# Patient Record
Sex: MALE | Race: WHITE | NOT HISPANIC OR LATINO | Employment: OTHER | URBAN - METROPOLITAN AREA
[De-identification: names, ages, dates, MRNs, and addresses within clinical notes are randomized per-mention and may not be internally consistent; named-entity substitution may affect disease eponyms.]

---

## 2023-01-30 PROBLEM — I11.9 BENIGN HYPERTENSIVE HEART DISEASE: Status: ACTIVE | Noted: 2023-01-30

## 2023-01-30 PROBLEM — D31.31 CHOROIDAL NEVUS OF RIGHT EYE: Status: ACTIVE | Noted: 2023-01-30

## 2023-01-30 PROBLEM — M25.562 LEFT KNEE PAIN: Status: ACTIVE | Noted: 2023-01-30

## 2023-01-30 PROBLEM — C67.9 BLADDER CANCER (MULTI): Status: ACTIVE | Noted: 2023-01-30

## 2023-01-30 PROBLEM — R31.0 GROSS HEMATURIA: Status: ACTIVE | Noted: 2023-01-30

## 2023-01-30 PROBLEM — M25.552 LEFT HIP PAIN: Status: ACTIVE | Noted: 2023-01-30

## 2023-01-30 PROBLEM — R82.90 ABNORMAL URINALYSIS: Status: ACTIVE | Noted: 2023-01-30

## 2023-01-30 PROBLEM — R97.20 BPH WITH ELEVATED PSA: Status: ACTIVE | Noted: 2023-01-30

## 2023-01-30 PROBLEM — N40.0 BPH WITH ELEVATED PSA: Status: ACTIVE | Noted: 2023-01-30

## 2023-01-30 PROBLEM — R03.0 BLOOD PRESSURE ELEVATED WITHOUT HISTORY OF HTN: Status: ACTIVE | Noted: 2023-01-30

## 2023-01-30 PROBLEM — M25.511 RIGHT SHOULDER PAIN: Status: ACTIVE | Noted: 2023-01-30

## 2023-01-30 PROBLEM — R60.9 EDEMA: Status: ACTIVE | Noted: 2023-01-30

## 2023-01-30 PROBLEM — R00.1 SINUS BRADYCARDIA: Status: ACTIVE | Noted: 2023-01-30

## 2023-01-30 PROBLEM — E78.2 MIXED HYPERLIPIDEMIA: Status: ACTIVE | Noted: 2023-01-30

## 2023-01-30 PROBLEM — R35.0 URINE FREQUENCY: Status: ACTIVE | Noted: 2023-01-30

## 2023-01-30 PROBLEM — I10 BENIGN ESSENTIAL HYPERTENSION: Status: ACTIVE | Noted: 2023-01-30

## 2023-01-30 PROBLEM — H91.90 HEARING LOSS: Status: ACTIVE | Noted: 2023-01-30

## 2023-01-30 PROBLEM — M17.9 KNEE OSTEOARTHRITIS: Status: ACTIVE | Noted: 2023-01-30

## 2023-01-30 PROBLEM — G47.30 SLEEP APNEA: Status: ACTIVE | Noted: 2023-01-30

## 2023-01-30 PROBLEM — M16.9 HIP OSTEOARTHRITIS: Status: ACTIVE | Noted: 2023-01-30

## 2023-01-30 PROBLEM — H25.813 COMBINED FORM OF AGE-RELATED CATARACT, BOTH EYES: Status: ACTIVE | Noted: 2023-01-30

## 2023-01-30 PROBLEM — M24.549: Status: ACTIVE | Noted: 2023-01-30

## 2023-01-30 PROBLEM — R73.02 GLUCOSE INTOLERANCE (IMPAIRED GLUCOSE TOLERANCE): Status: ACTIVE | Noted: 2023-01-30

## 2023-01-30 RX ORDER — BACILLUS CALMETTE-GUERIN 50 MG/50ML
POWDER, FOR SUSPENSION INTRAVESICAL
COMMUNITY
Start: 2020-06-02 | End: 2024-01-31 | Stop reason: HOSPADM

## 2023-01-30 RX ORDER — TAMSULOSIN HYDROCHLORIDE 0.4 MG/1
1 CAPSULE ORAL DAILY
COMMUNITY
Start: 2018-03-26 | End: 2023-11-01

## 2023-01-30 RX ORDER — MOXIFLOXACIN 5 MG/ML
1 SOLUTION/ DROPS OPHTHALMIC 3 TIMES DAILY
COMMUNITY
End: 2024-06-03 | Stop reason: ALTCHOICE

## 2023-01-30 RX ORDER — DICLOFENAC SODIUM 1 MG/ML
1 SOLUTION/ DROPS OPHTHALMIC 3 TIMES DAILY
COMMUNITY
End: 2024-06-03 | Stop reason: ALTCHOICE

## 2023-01-30 RX ORDER — PREDNISOLONE ACETATE 10 MG/ML
1 SUSPENSION/ DROPS OPHTHALMIC 3 TIMES DAILY
COMMUNITY
End: 2024-06-03 | Stop reason: ALTCHOICE

## 2023-01-30 RX ORDER — LIFITEGRAST 50 MG/ML
SOLUTION/ DROPS OPHTHALMIC
COMMUNITY
End: 2024-06-03 | Stop reason: ALTCHOICE

## 2023-01-30 RX ORDER — ACETAZOLAMIDE 250 MG/1
1 TABLET ORAL 2 TIMES DAILY
COMMUNITY
End: 2024-06-03 | Stop reason: ALTCHOICE

## 2023-02-23 LAB
PROSTATE SPECIFIC AG (NG/ML) IN SER/PLAS: 5.1 NG/ML (ref 0–4)
PROSTATE SPECIFIC AG FREE (NG/ML) IN SER/PLAS: 1.4 NG/ML
PROSTATE SPECIFIC AG FREE/PROSTATE SPECIFIC AG TOTAL IN SER/PLAS: 27 %

## 2023-03-14 ENCOUNTER — OFFICE VISIT (OUTPATIENT)
Dept: PRIMARY CARE | Facility: CLINIC | Age: 73
End: 2023-03-14
Payer: MEDICARE

## 2023-03-14 VITALS — DIASTOLIC BLOOD PRESSURE: 73 MMHG | BODY MASS INDEX: 33.9 KG/M2 | SYSTOLIC BLOOD PRESSURE: 141 MMHG | WEIGHT: 264 LBS

## 2023-03-14 DIAGNOSIS — E78.2 MIXED HYPERLIPIDEMIA: ICD-10-CM

## 2023-03-14 DIAGNOSIS — G47.30 SLEEP APNEA, UNSPECIFIED TYPE: ICD-10-CM

## 2023-03-14 DIAGNOSIS — I10 BENIGN ESSENTIAL HYPERTENSION: Primary | ICD-10-CM

## 2023-03-14 PROCEDURE — 1157F ADVNC CARE PLAN IN RCRD: CPT | Performed by: INTERNAL MEDICINE

## 2023-03-14 PROCEDURE — 99213 OFFICE O/P EST LOW 20 MIN: CPT | Performed by: INTERNAL MEDICINE

## 2023-03-14 PROCEDURE — 3078F DIAST BP <80 MM HG: CPT | Performed by: INTERNAL MEDICINE

## 2023-03-14 PROCEDURE — 3077F SYST BP >= 140 MM HG: CPT | Performed by: INTERNAL MEDICINE

## 2023-03-14 NOTE — PROGRESS NOTES
Subjective   Patient ID: Robson Fletcher is a 72 y.o. male who presents for No chief complaint on file..    HPI follow-up visit he had been on the losartan medication no side effects even brought a home blood pressure monitoring machine had been averaging in the 130s over 70s with normal pulse rate weight has not changed much as not exercising as much as he had hoped to had follow-up for his prostate with a renal ultrasound there were some new cysts that were found he has follow-up with a urologist upcoming    Review of Systems    Objective   There were no vitals taken for this visit.    Physical Exam vital signs noted alert and oriented x3 NCAT no JVD chest clear to auscultation and percussion CV regular rate and rhythm S1-S2 without murmur gallop or rub extremities no clubbing cyanosis or edema normal distal pulses    Assessment/Plan impression hypertension hyperlipidemia LINDA  Plan he has been wearing the CPAP with his new mask 6 to 7 hours per night he has not yet been able to lose weight advised on decreasing carbohydrates in salt and increasing exercise and water consumption then will recheck weight and blood pressure along with his blood work his LDL cholesterols were reviewed and will recheck at his physical examination and may consider statin medication changes at that time follow-up sooner as needed continue with the losartan

## 2023-06-15 LAB — PROSTATE SPECIFIC AG (NG/ML) IN SER/PLAS: 4.33 NG/ML (ref 0–4)

## 2023-06-20 ENCOUNTER — OFFICE VISIT (OUTPATIENT)
Dept: PRIMARY CARE | Facility: CLINIC | Age: 73
End: 2023-06-20
Payer: MEDICARE

## 2023-06-20 VITALS
SYSTOLIC BLOOD PRESSURE: 122 MMHG | WEIGHT: 249.75 LBS | DIASTOLIC BLOOD PRESSURE: 72 MMHG | BODY MASS INDEX: 32.07 KG/M2

## 2023-06-20 DIAGNOSIS — G47.30 SLEEP APNEA, UNSPECIFIED TYPE: ICD-10-CM

## 2023-06-20 DIAGNOSIS — I10 PRIMARY HYPERTENSION: ICD-10-CM

## 2023-06-20 DIAGNOSIS — Z00.00 HEALTH CARE MAINTENANCE: Primary | ICD-10-CM

## 2023-06-20 DIAGNOSIS — E78.2 MIXED HYPERLIPIDEMIA: ICD-10-CM

## 2023-06-20 DIAGNOSIS — N40.0 BENIGN PROSTATIC HYPERPLASIA, UNSPECIFIED WHETHER LOWER URINARY TRACT SYMPTOMS PRESENT: ICD-10-CM

## 2023-06-20 LAB
ANION GAP IN SER/PLAS: 14 MMOL/L (ref 10–20)
CALCIUM (MG/DL) IN SER/PLAS: 9.3 MG/DL (ref 8.6–10.6)
CARBON DIOXIDE, TOTAL (MMOL/L) IN SER/PLAS: 24 MMOL/L (ref 21–32)
CHLORIDE (MMOL/L) IN SER/PLAS: 107 MMOL/L (ref 98–107)
CHOLESTEROL (MG/DL) IN SER/PLAS: 214 MG/DL (ref 0–199)
CHOLESTEROL IN HDL (MG/DL) IN SER/PLAS: 56.8 MG/DL
CHOLESTEROL/HDL RATIO: 3.8
CREATININE (MG/DL) IN SER/PLAS: 0.81 MG/DL (ref 0.5–1.3)
ERYTHROCYTE DISTRIBUTION WIDTH (RATIO) BY AUTOMATED COUNT: 13.6 % (ref 11.5–14.5)
ERYTHROCYTE MEAN CORPUSCULAR HEMOGLOBIN CONCENTRATION (G/DL) BY AUTOMATED: 31.8 G/DL (ref 32–36)
ERYTHROCYTE MEAN CORPUSCULAR VOLUME (FL) BY AUTOMATED COUNT: 92 FL (ref 80–100)
ERYTHROCYTES (10*6/UL) IN BLOOD BY AUTOMATED COUNT: 4.88 X10E12/L (ref 4.5–5.9)
GFR MALE: >90 ML/MIN/1.73M2
GLUCOSE (MG/DL) IN SER/PLAS: 82 MG/DL (ref 74–99)
HEMATOCRIT (%) IN BLOOD BY AUTOMATED COUNT: 44.9 % (ref 41–52)
HEMOGLOBIN (G/DL) IN BLOOD: 14.3 G/DL (ref 13.5–17.5)
LDL: 144 MG/DL (ref 0–99)
LEUKOCYTES (10*3/UL) IN BLOOD BY AUTOMATED COUNT: 4.5 X10E9/L (ref 4.4–11.3)
NRBC (PER 100 WBCS) BY AUTOMATED COUNT: 0 /100 WBC (ref 0–0)
PLATELETS (10*3/UL) IN BLOOD AUTOMATED COUNT: 206 X10E9/L (ref 150–450)
POTASSIUM (MMOL/L) IN SER/PLAS: 4.3 MMOL/L (ref 3.5–5.3)
SODIUM (MMOL/L) IN SER/PLAS: 141 MMOL/L (ref 136–145)
TRIGLYCERIDE (MG/DL) IN SER/PLAS: 65 MG/DL (ref 0–149)
UREA NITROGEN (MG/DL) IN SER/PLAS: 17 MG/DL (ref 6–23)
VLDL: 13 MG/DL (ref 0–40)

## 2023-06-20 PROCEDURE — 93000 ELECTROCARDIOGRAM COMPLETE: CPT | Performed by: INTERNAL MEDICINE

## 2023-06-20 PROCEDURE — 80061 LIPID PANEL: CPT

## 2023-06-20 PROCEDURE — 1157F ADVNC CARE PLAN IN RCRD: CPT | Performed by: INTERNAL MEDICINE

## 2023-06-20 PROCEDURE — 1160F RVW MEDS BY RX/DR IN RCRD: CPT | Performed by: INTERNAL MEDICINE

## 2023-06-20 PROCEDURE — G0439 PPPS, SUBSEQ VISIT: HCPCS | Performed by: INTERNAL MEDICINE

## 2023-06-20 PROCEDURE — 80048 BASIC METABOLIC PNL TOTAL CA: CPT

## 2023-06-20 PROCEDURE — 1036F TOBACCO NON-USER: CPT | Performed by: INTERNAL MEDICINE

## 2023-06-20 PROCEDURE — 3078F DIAST BP <80 MM HG: CPT | Performed by: INTERNAL MEDICINE

## 2023-06-20 PROCEDURE — 85027 COMPLETE CBC AUTOMATED: CPT

## 2023-06-20 PROCEDURE — 1170F FXNL STATUS ASSESSED: CPT | Performed by: INTERNAL MEDICINE

## 2023-06-20 PROCEDURE — 3074F SYST BP LT 130 MM HG: CPT | Performed by: INTERNAL MEDICINE

## 2023-06-20 PROCEDURE — 1159F MED LIST DOCD IN RCRD: CPT | Performed by: INTERNAL MEDICINE

## 2023-06-20 PROCEDURE — 99214 OFFICE O/P EST MOD 30 MIN: CPT | Performed by: INTERNAL MEDICINE

## 2023-06-20 NOTE — PROGRESS NOTES
Subjective   Patient ID: Robson Fletcher is a 73 y.o. male who presents for No chief complaint on file..    HPI CPE see updated front sheet no chest pain no shortness of breath no side effect with medication trying to sleep between 6 and 7 hours each night with his CPAP has been to the urologist PSA is currently on the lower side for him has upcoming scans in the fall and follow-up with their bowels normal bones muscles joints okay    Past medical history hypertension hyperlipidemia LINDA prostate    Medication losartan    Allergies noted and unchanged    Social history no tobacco    Family history noted and unchanged    Prevention some exercise colonoscopy approximately 5 years ago has had follow-up with urology prior laboratory results reviewed    Depression screen not depressed    Review of Systems    Objective   There were no vitals taken for this visit.    Physical Exam  Vital signs noted alert and oriented x3 NCAT PERRLA EOMI nares without discharge OP benign TM normal bilateral EAC clear bilateral no AC nodes no JVD or bruit no thyromegaly chest clear to auscultation and percussion CV regular rate and rhythm S1-S2 without murmur gallop or rub abdomen soft nontender normal active bowel sounds no rebound or guarding no HSM LS spine normal curvature negative straight leg raise negative logroll negative SI joint tenderness extremities no clubbing cyanosis or edema normal distal pulses DTR 2+  Assessment/Plan    impression General medical examination prostate diagnosis hypertension hyperlipidemia LINDA  Plan check EKG rule out LVH advised on blood pressure blood pressure medicine continue with losartan at the current time check Chem-7 advised on glucose potassium and kidney function check CBC advised on blood count follow-up on next colonoscopy continue with CPAP sleep hygiene check lipid panel advised on cholesterol profile previous PSA noted previous lipids discussed with him good diet regular exercise increase water  consumption weight loss recheck 3 months based on above TT 60 cc 31    follow-up by neurology (d/w) check and previous visits (cardiac and cpap) check

## 2023-07-02 ASSESSMENT — ENCOUNTER SYMPTOMS
OCCASIONAL FEELINGS OF UNSTEADINESS: 0
LOSS OF SENSATION IN FEET: 0
DEPRESSION: 0

## 2023-07-02 ASSESSMENT — ACTIVITIES OF DAILY LIVING (ADL)
GROCERY_SHOPPING: INDEPENDENT
BATHING: INDEPENDENT
DOING_HOUSEWORK: INDEPENDENT
DRESSING: INDEPENDENT
TAKING_MEDICATION: INDEPENDENT
MANAGING_FINANCES: INDEPENDENT

## 2023-07-02 ASSESSMENT — PATIENT HEALTH QUESTIONNAIRE - PHQ9
2. FEELING DOWN, DEPRESSED OR HOPELESS: NOT AT ALL
1. LITTLE INTEREST OR PLEASURE IN DOING THINGS: NOT AT ALL
SUM OF ALL RESPONSES TO PHQ9 QUESTIONS 1 AND 2: 0

## 2023-08-30 PROBLEM — N28.1 BILATERAL RENAL CYSTS: Status: ACTIVE | Noted: 2023-08-30

## 2023-08-30 PROBLEM — Z96.1 PSEUDOPHAKIA OF BOTH EYES: Status: ACTIVE | Noted: 2023-08-30

## 2023-08-30 RX ORDER — LOSARTAN POTASSIUM 50 MG/1
50 TABLET ORAL DAILY
COMMUNITY
Start: 2023-05-24 | End: 2023-11-02

## 2023-09-22 LAB
ANION GAP IN SER/PLAS: 11 MMOL/L (ref 10–20)
CALCIUM (MG/DL) IN SER/PLAS: 8.8 MG/DL (ref 8.6–10.3)
CARBON DIOXIDE, TOTAL (MMOL/L) IN SER/PLAS: 26 MMOL/L (ref 21–32)
CHLORIDE (MMOL/L) IN SER/PLAS: 109 MMOL/L (ref 98–107)
CREATININE (MG/DL) IN SER/PLAS: 0.79 MG/DL (ref 0.5–1.3)
GFR MALE: >90 ML/MIN/1.73M2
GLUCOSE (MG/DL) IN SER/PLAS: 96 MG/DL (ref 74–99)
POTASSIUM (MMOL/L) IN SER/PLAS: 4.1 MMOL/L (ref 3.5–5.3)
PROSTATE SPECIFIC AG (NG/ML) IN SER/PLAS: 4.92 NG/ML (ref 0–4)
SODIUM (MMOL/L) IN SER/PLAS: 142 MMOL/L (ref 136–145)
UREA NITROGEN (MG/DL) IN SER/PLAS: 16 MG/DL (ref 6–23)

## 2023-10-13 ENCOUNTER — OFFICE VISIT (OUTPATIENT)
Dept: PRIMARY CARE | Facility: CLINIC | Age: 73
End: 2023-10-13
Payer: MEDICARE

## 2023-10-13 VITALS — DIASTOLIC BLOOD PRESSURE: 76 MMHG | SYSTOLIC BLOOD PRESSURE: 128 MMHG

## 2023-10-13 DIAGNOSIS — Z00.00 HEALTH CARE MAINTENANCE: Primary | ICD-10-CM

## 2023-10-13 DIAGNOSIS — I10 PRIMARY HYPERTENSION: ICD-10-CM

## 2023-10-13 DIAGNOSIS — E78.2 MIXED HYPERLIPIDEMIA: ICD-10-CM

## 2023-10-13 DIAGNOSIS — K44.9 HIATAL HERNIA: ICD-10-CM

## 2023-10-13 DIAGNOSIS — I70.90 ATHEROSCLEROSIS: ICD-10-CM

## 2023-10-13 PROCEDURE — 99213 OFFICE O/P EST LOW 20 MIN: CPT | Performed by: INTERNAL MEDICINE

## 2023-10-13 PROCEDURE — 1159F MED LIST DOCD IN RCRD: CPT | Performed by: INTERNAL MEDICINE

## 2023-10-13 PROCEDURE — 3074F SYST BP LT 130 MM HG: CPT | Performed by: INTERNAL MEDICINE

## 2023-10-13 PROCEDURE — 1126F AMNT PAIN NOTED NONE PRSNT: CPT | Performed by: INTERNAL MEDICINE

## 2023-10-13 PROCEDURE — 1160F RVW MEDS BY RX/DR IN RCRD: CPT | Performed by: INTERNAL MEDICINE

## 2023-10-13 PROCEDURE — 3078F DIAST BP <80 MM HG: CPT | Performed by: INTERNAL MEDICINE

## 2023-10-13 PROCEDURE — 1036F TOBACCO NON-USER: CPT | Performed by: INTERNAL MEDICINE

## 2023-10-13 RX ORDER — ATORVASTATIN CALCIUM 20 MG/1
20 TABLET, FILM COATED ORAL DAILY
Qty: 30 TABLET | Refills: 5 | Status: SHIPPED | OUTPATIENT
Start: 2023-10-13 | End: 2023-11-07

## 2023-10-13 NOTE — PROGRESS NOTES
Subjective   Patient ID: Robson Fletcher is a 73 y.o. male who presents for No chief complaint on file..    HPI follow-up visit had CT scan with the urologist but is unable to have an appointment till December and he was concerned about some of the findings had been followed for cysts in the left kidney apparently there were some in the right kidney but not available on this current CT scan also some SMA atherosclerosis and a hiatal hernia no chest pain no shortness of breath previous results reviewed    Review of Systems    Objective   There were no vitals taken for this visit.    Physical Exam vital signs noted alert and oriented x3 NCAT no JVD or bruit chest clear to auscultation CV regular rate and rhythm S1-S2 abdomen soft nontender normal active bowel sounds extremities no clubbing cyanosis or edema normal distal pulses    Assessment/Plan impression SMA atherosclerosis hypertension hyperlipidemia renal cyst BPH with increased PSA hiatal hernia  Plan follow-up with urology regarding BPH and PSA and cysts likely do not need much in terms of treatment for these cysts continue with good blood pressure measuring and monitoring avoidance of NSAIDs PPI or H2 blocker if symptomatic not current for hiatal hernia diet weight loss exercise continue with losartan okay to add atorvastatin 20 mg 1 p.o. daily and recheck blood pressure and lipids in February 2024 recheck anytime sooner

## 2023-10-26 NOTE — PROGRESS NOTES
FUV    Last seen - 3/17/23     HISTORY OF PRESENT ILLNESS:   Bernabe Fletcher is a 73 y.o. male who is being seen today for cystoscopic surveillance.     Hx: -BPH with 2 prior negative prostate biopsies (most recent 11/27/19)  -  non-invasive papillary urothelial carcinoma, high-grade, diagnosed 11/2018,   -s/p completion of BCG induction, and maintenance 10/2019, 06/2020, and 01/22/2021, and bladder biopsy on 06/22/2021.     Prior Labs: PSA panel on 2/20/23 was 5.1 with 27% free, PSA on 06/03/2022 was 4.89 and on 05/26/2020 was 4.3 and 5.49 on 08/08/19.     Prior Imaging: Renal US on 2/20/23 demonstrated round hypoechoic mass centered in the corticomedullary junction in the medial interpolar region measuring 1.6 cm. There is another such finding in the upper pole measuring 1.2 cm. No calculus or perinephric abnormality. Also possible round hypoechoic mass in the left lower pole measuring 2.8 cm.     PAST MEDICAL HISTORY:  Past Medical History:   Diagnosis Date    Essential (primary) hypertension 07/05/2021    Benign essential hypertension       PAST SURGICAL HISTORY:  Past Surgical History:   Procedure Laterality Date    APPENDECTOMY  07/06/2017    Appendectomy    APPENDECTOMY  10/13/2014    Appendectomy    COLONOSCOPY  10/13/2014    Complete Colonoscopy        ALLERGIES:   No Known Allergies     MEDICATIONS:   Current Outpatient Medications   Medication Instructions    acetaZOLAMIDE (Diamox) 250 mg tablet 1 tablet, oral, 2 times daily    atorvastatin (LIPITOR) 20 mg, oral, Daily    BCG, live, ([place] BCG) 50 mg injection Give 1 vial weekly x 3 weeks for maintenance dose    diclofenac (Voltaren) 0.1 % ophthalmic solution 1 drop, Right Eye, 3 times daily, For 3 days before surgery, and 1 week after surgery    lifitegrast (Xiidra) 5 % dropperette ophthalmic (eye)    losartan (COZAAR) 50 mg, oral, Daily    moxifloxacin (Vigamox) 0.5 % ophthalmic solution 1 drop, Right Eye, 3 times daily, Before surgery and 3 weeks after  "surgery    prednisoLONE acetate (Pred-Forte) 1 % ophthalmic suspension 1 drop, Left Eye, 3 times daily, 3 weeks after surgery    tamsulosin (Flomax) 0.4 mg 24 hr capsule 1 capsule, oral, Daily        PHYSICAL EXAM:  There were no vitals taken for this visit.  Constitutional: Patient appears well-developed and well-nourished. No distress.    Pulmonary/Chest: Effort normal. No respiratory distress.   Abdominal: Soft, ND NT  :   Musculoskeletal: Normal range of motion.    Neurological: Alert and oriented to person, place, and time.  Psychiatric: Normal mood and affect. Behavior is normal. Thought content normal.      Labs  No results found for: \"TESTOSTERONE\"  Lab Results   Component Value Date    PSA 4.92 (H) 09/22/2023     No components found for: \"CBC\"  Lab Results   Component Value Date    CREATININE 0.79 09/22/2023     No components found for: \"TESTOTMS\"  No results found for: \"TESTF\"    Imaging: Renal CT 9/26/23 demonstrating Lower pole cyst left kidney with additional probable parapelvic cyst. No solid enhancing renal mass identified. Atherosclerosis. Mild stenosis of the proximal SMA.    Discussion: Patient is doing well. No new urinary complaints. Denies hematuria. Cystoscopy was completed today due to high grade non-invasive papillary urothelial carcinoma and demonstrated a lot of retroflexion and showed a plaque-like area on left side of prostate. However given that he is at his 5 year dieter, will plan a repeat cysto in 3 months to ensure this has not worsened. Cytology sent. Will do another cysto in 3 months, if normal, will move to yearly. Will do renal imaging in 2 years. All questions and concerns were addressed. Patient verbalizes understanding and has no other questions at this time.       Assessment:    No diagnosis found.    Bernabe Fletcher is a 73 y.o. male here for FUV     Plan:   1) Plan for cystoscopy in 3 months  2) Will plan for renal imaging in 2 years  3) All questions and concerns were " addressed. Patient verbalizes understanding and has no other questions at this time.    Scribe Attestation  By signing my name below, I, Sangeeta Zayas   attest that this documentation has been prepared under the direction and in the presence of Kuldeep Baer MD.

## 2023-10-27 ENCOUNTER — PROCEDURE VISIT (OUTPATIENT)
Dept: UROLOGY | Facility: HOSPITAL | Age: 73
End: 2023-10-27
Payer: MEDICARE

## 2023-10-27 ENCOUNTER — APPOINTMENT (OUTPATIENT)
Dept: LAB | Facility: LAB | Age: 73
End: 2023-10-27
Payer: MEDICARE

## 2023-10-27 VITALS — DIASTOLIC BLOOD PRESSURE: 88 MMHG | HEART RATE: 61 BPM | SYSTOLIC BLOOD PRESSURE: 154 MMHG

## 2023-10-27 DIAGNOSIS — C67.9 MALIGNANT NEOPLASM OF URINARY BLADDER, UNSPECIFIED SITE (MULTI): Primary | ICD-10-CM

## 2023-10-27 DIAGNOSIS — Z79.2 PROPHYLACTIC ANTIBIOTIC: ICD-10-CM

## 2023-10-27 LAB
POC APPEARANCE, URINE: CLEAR
POC BILIRUBIN, URINE: NEGATIVE
POC BLOOD, URINE: NEGATIVE
POC COLOR, URINE: YELLOW
POC GLUCOSE, URINE: NEGATIVE MG/DL
POC KETONES, URINE: NEGATIVE MG/DL
POC LEUKOCYTES, URINE: NEGATIVE
POC NITRITE,URINE: NEGATIVE
POC PH, URINE: 5.5 PH
POC PROTEIN, URINE: NEGATIVE MG/DL
POC SPECIFIC GRAVITY, URINE: 1.02
POC UROBILINOGEN, URINE: 0.2 EU/DL

## 2023-10-27 PROCEDURE — 52000 CYSTOURETHROSCOPY: CPT | Performed by: UROLOGY

## 2023-10-27 PROCEDURE — 88112 CYTOPATH CELL ENHANCE TECH: CPT | Performed by: PATHOLOGY

## 2023-10-27 PROCEDURE — 88112 CYTOPATH CELL ENHANCE TECH: CPT | Mod: TC | Performed by: UROLOGY

## 2023-10-27 PROCEDURE — 81003 URINALYSIS AUTO W/O SCOPE: CPT | Mod: QW | Performed by: UROLOGY

## 2023-10-27 PROCEDURE — 88112 CYTOPATH CELL ENHANCE TECH: CPT | Mod: TC,MCY | Performed by: UROLOGY

## 2023-10-27 RX ORDER — CIPROFLOXACIN 500 MG/1
500 TABLET ORAL ONCE
Status: COMPLETED | OUTPATIENT
Start: 2023-10-27 | End: 2024-01-18

## 2023-10-30 LAB
LABORATORY COMMENT REPORT: NORMAL
LABORATORY COMMENT REPORT: NORMAL
PATH REPORT.FINAL DX SPEC: NORMAL
PATH REPORT.GROSS SPEC: NORMAL
PATH REPORT.RELEVANT HX SPEC: NORMAL
PATH REPORT.TOTAL CANCER: NORMAL

## 2023-10-31 DIAGNOSIS — I10 BENIGN ESSENTIAL HYPERTENSION: Primary | ICD-10-CM

## 2023-11-01 DIAGNOSIS — Z00.00 HEALTH CARE MAINTENANCE: Primary | ICD-10-CM

## 2023-11-01 RX ORDER — TAMSULOSIN HYDROCHLORIDE 0.4 MG/1
0.4 CAPSULE ORAL DAILY
Qty: 90 CAPSULE | Refills: 1 | Status: SHIPPED | OUTPATIENT
Start: 2023-11-01 | End: 2024-04-27

## 2023-11-02 RX ORDER — LOSARTAN POTASSIUM 50 MG/1
50 TABLET ORAL DAILY
Qty: 90 TABLET | Refills: 1 | Status: SHIPPED | OUTPATIENT
Start: 2023-11-02 | End: 2024-01-25

## 2023-11-04 DIAGNOSIS — Z00.00 HEALTH CARE MAINTENANCE: ICD-10-CM

## 2023-11-07 RX ORDER — ATORVASTATIN CALCIUM 20 MG/1
20 TABLET, FILM COATED ORAL DAILY
Qty: 90 TABLET | Refills: 2 | Status: SHIPPED | OUTPATIENT
Start: 2023-11-07 | End: 2023-11-08 | Stop reason: SDUPTHER

## 2023-11-08 DIAGNOSIS — Z00.00 HEALTH CARE MAINTENANCE: ICD-10-CM

## 2023-11-08 RX ORDER — ATORVASTATIN CALCIUM 20 MG/1
20 TABLET, FILM COATED ORAL DAILY
Qty: 90 TABLET | Refills: 2 | Status: SHIPPED | OUTPATIENT
Start: 2023-11-08

## 2023-11-21 ENCOUNTER — HOSPITAL ENCOUNTER (OUTPATIENT)
Dept: RADIOLOGY | Facility: HOSPITAL | Age: 73
Discharge: HOME | End: 2023-11-21
Payer: MEDICARE

## 2023-11-21 DIAGNOSIS — R76.11 POSITIVE TB TEST: ICD-10-CM

## 2023-11-21 PROCEDURE — 71045 X-RAY EXAM CHEST 1 VIEW: CPT | Performed by: STUDENT IN AN ORGANIZED HEALTH CARE EDUCATION/TRAINING PROGRAM

## 2023-11-21 PROCEDURE — 71045 X-RAY EXAM CHEST 1 VIEW: CPT

## 2024-01-16 NOTE — PROGRESS NOTES
FUV    Last seen - 10/27/23     HISTORY OF PRESENT ILLNESS:   Bernabe Fletcher is a 73 y.o. male who is being seen today for continued cystoscopic surveillance.     PAST MEDICAL HISTORY:  Past Medical History:   Diagnosis Date    Essential (primary) hypertension 07/05/2021    Benign essential hypertension   - BPH with 2 prior negative prostate biopsies (most recent 11/27/19)  - Non-invasive papillary urothelial carcinoma, high-grade, diagnosed 11/2018  - S/P completion of BCG induction, and maintenance 10/2019, 06/2020, and 01/22/2021, and bladder biopsy on 06/22/2021.     PAST SURGICAL HISTORY:  Past Surgical History:   Procedure Laterality Date    APPENDECTOMY  07/06/2017    Appendectomy    APPENDECTOMY  10/13/2014    Appendectomy    COLONOSCOPY  10/13/2014    Complete Colonoscopy        ALLERGIES:   No Known Allergies     MEDICATIONS:   Current Outpatient Medications   Medication Instructions    acetaZOLAMIDE (Diamox) 250 mg tablet 1 tablet, oral, 2 times daily    atorvastatin (LIPITOR) 20 mg, oral, Daily    BCG, live, ([place] BCG) 50 mg injection Give 1 vial weekly x 3 weeks for maintenance dose    diclofenac (Voltaren) 0.1 % ophthalmic solution 1 drop, Right Eye, 3 times daily, For 3 days before surgery, and 1 week after surgery    lifitegrast (Xiidra) 5 % dropperette ophthalmic (eye)    losartan (COZAAR) 50 mg, oral, Daily    moxifloxacin (Vigamox) 0.5 % ophthalmic solution 1 drop, Right Eye, 3 times daily, Before surgery and 3 weeks after surgery    prednisoLONE acetate (Pred-Forte) 1 % ophthalmic suspension 1 drop, Left Eye, 3 times daily, 3 weeks after surgery    tamsulosin (FLOMAX) 0.4 mg, oral, Daily        PHYSICAL EXAM:  There were no vitals taken for this visit.  Constitutional: Patient appears well-developed and well-nourished. No distress.    Pulmonary/Chest: Effort normal. No respiratory distress.   Abdominal: Soft, ND NT  :   Musculoskeletal: Normal range of motion.    Neurological: Alert and  "oriented to person, place, and time.  Psychiatric: Normal mood and affect. Behavior is normal. Thought content normal.      Labs  No results found for: \"TESTOSTERONE\"  Lab Results   Component Value Date    PSA 4.92 (H) 09/22/2023   PSA panel on 2/20/23 was 5.1 with 27% free. PSA on 06/03/2022 was 4.89 and on 05/26/2020 was 4.3 and 5.49 on 08/08/19.   No components found for: \"CBC\"  Lab Results   Component Value Date    CREATININE 0.79 09/22/2023     No components found for: \"TESTOTMS\"  No results found for: \"TESTF\"    Imaging:   - Renal CT 9/26/23 demonstrating Lower pole cyst left kidney with additional probable parapelvic cyst. No solid enhancing renal mass identified. Atherosclerosis. Mild stenosis of the proximal SMA.    Discussion:   Patient is doing well. Reports on 1/14/24 he had \"two big drops\" of blood in urine, he otherwise denies any dysuria. He feels he is fully emptying his bladder but on occasion he may have to urinate again shortly after, this is not bothersome.     Cystoscopy was completed today due to high grade non-invasive papillary urothelial carcinoma and demonstrated showed a persistent plaque-like area on the left lateral aspect of median lobe of prostate. Will discuss doing biopsy vs resection or mini TUR on this portion of prostate to ensure this is not urothelial tumor. I suspect this is plaque overlying the prostate. Will plan to biopsy area. May require flexible Citoscope to see and retroflexion on scope.    Previous Cysto schedule: q6 months until 11/2023, and then yearly if no recurrences by year 5.     Assessment:      1. Malignant neoplasm of urinary bladder, unspecified site (CMS/HCC)  Cystoscopy          Bernabe Fletcher is a 73 y.o. male here for FUV     Plan:   1) Plan for biopsy of area on cystoscopy, pt is in agreement with plan.  2) All questions and concerns were addressed. Patient verbalizes understanding and has no other questions at this time.    Scribe Attestation  By " signing my name below, I, Marie Farrelliboumou   attest that this documentation has been prepared under the direction and in the presence of Kuldeep Baer MD.

## 2024-01-18 ENCOUNTER — PROCEDURE VISIT (OUTPATIENT)
Dept: UROLOGY | Facility: HOSPITAL | Age: 74
End: 2024-01-18
Payer: MEDICARE

## 2024-01-18 DIAGNOSIS — R68.89 OTHER GENERAL SYMPTOMS AND SIGNS: ICD-10-CM

## 2024-01-18 DIAGNOSIS — C67.9 MALIGNANT NEOPLASM OF URINARY BLADDER, UNSPECIFIED SITE (MULTI): Primary | ICD-10-CM

## 2024-01-18 DIAGNOSIS — R79.1 ABNORMAL COAGULATION PROFILE: ICD-10-CM

## 2024-01-18 PROCEDURE — 76872 US TRANSRECTAL: CPT | Performed by: UROLOGY

## 2024-01-18 PROCEDURE — 2500000001 HC RX 250 WO HCPCS SELF ADMINISTERED DRUGS (ALT 637 FOR MEDICARE OP): Performed by: UROLOGY

## 2024-01-18 RX ORDER — SODIUM CHLORIDE 9 MG/ML
100 INJECTION, SOLUTION INTRAVENOUS CONTINUOUS
Status: CANCELLED | OUTPATIENT
Start: 2024-01-18

## 2024-01-18 RX ORDER — CHLORHEXIDINE GLUCONATE 40 MG/ML
SOLUTION TOPICAL DAILY PRN
Status: CANCELLED | OUTPATIENT
Start: 2024-01-18

## 2024-01-18 RX ADMIN — CIPROFLOXACIN 500 MG: 500 TABLET ORAL at 13:46

## 2024-01-18 ASSESSMENT — PAIN SCALES - GENERAL: PAINLEVEL: 0-NO PAIN

## 2024-01-22 ENCOUNTER — LAB (OUTPATIENT)
Dept: LAB | Facility: LAB | Age: 74
End: 2024-01-22
Payer: MEDICARE

## 2024-01-22 DIAGNOSIS — R68.89 OTHER GENERAL SYMPTOMS AND SIGNS: ICD-10-CM

## 2024-01-22 DIAGNOSIS — R79.1 ABNORMAL COAGULATION PROFILE: ICD-10-CM

## 2024-01-22 DIAGNOSIS — C67.9 MALIGNANT NEOPLASM OF URINARY BLADDER, UNSPECIFIED SITE (MULTI): ICD-10-CM

## 2024-01-22 LAB
ANION GAP SERPL CALC-SCNC: 10 MMOL/L (ref 10–20)
APTT PPP: 32 SECONDS (ref 27–38)
BUN SERPL-MCNC: 18 MG/DL (ref 6–23)
CALCIUM SERPL-MCNC: 9 MG/DL (ref 8.6–10.3)
CHLORIDE SERPL-SCNC: 108 MMOL/L (ref 98–107)
CO2 SERPL-SCNC: 28 MMOL/L (ref 21–32)
CREAT SERPL-MCNC: 0.96 MG/DL (ref 0.5–1.3)
EGFRCR SERPLBLD CKD-EPI 2021: 84 ML/MIN/1.73M*2
ERYTHROCYTE [DISTWIDTH] IN BLOOD BY AUTOMATED COUNT: 13.4 % (ref 11.5–14.5)
GLUCOSE SERPL-MCNC: 102 MG/DL (ref 74–99)
HCT VFR BLD AUTO: 49.6 % (ref 41–52)
HGB BLD-MCNC: 16.1 G/DL (ref 13.5–17.5)
INR PPP: 1.1 (ref 0.9–1.1)
MCH RBC QN AUTO: 29.6 PG (ref 26–34)
MCHC RBC AUTO-ENTMCNC: 32.5 G/DL (ref 32–36)
MCV RBC AUTO: 91 FL (ref 80–100)
NRBC BLD-RTO: 0 /100 WBCS (ref 0–0)
PLATELET # BLD AUTO: 229 X10*3/UL (ref 150–450)
POTASSIUM SERPL-SCNC: 4.4 MMOL/L (ref 3.5–5.3)
PROTHROMBIN TIME: 12.1 SECONDS (ref 9.8–12.8)
RBC # BLD AUTO: 5.44 X10*6/UL (ref 4.5–5.9)
SODIUM SERPL-SCNC: 142 MMOL/L (ref 136–145)
WBC # BLD AUTO: 4.9 X10*3/UL (ref 4.4–11.3)

## 2024-01-22 PROCEDURE — 87086 URINE CULTURE/COLONY COUNT: CPT

## 2024-01-22 PROCEDURE — 36415 COLL VENOUS BLD VENIPUNCTURE: CPT

## 2024-01-22 PROCEDURE — 85610 PROTHROMBIN TIME: CPT

## 2024-01-22 PROCEDURE — 85730 THROMBOPLASTIN TIME PARTIAL: CPT

## 2024-01-22 PROCEDURE — 80048 BASIC METABOLIC PNL TOTAL CA: CPT

## 2024-01-22 PROCEDURE — 85027 COMPLETE CBC AUTOMATED: CPT

## 2024-01-23 LAB — BACTERIA UR CULT: NORMAL

## 2024-01-24 DIAGNOSIS — I10 BENIGN ESSENTIAL HYPERTENSION: ICD-10-CM

## 2024-01-25 ENCOUNTER — APPOINTMENT (OUTPATIENT)
Dept: UROLOGY | Facility: HOSPITAL | Age: 74
End: 2024-01-25
Payer: MEDICARE

## 2024-01-25 RX ORDER — LOSARTAN POTASSIUM 50 MG/1
50 TABLET ORAL DAILY
Qty: 90 TABLET | Refills: 1 | Status: SHIPPED | OUTPATIENT
Start: 2024-01-25

## 2024-01-29 ENCOUNTER — ANESTHESIA EVENT (OUTPATIENT)
Dept: OPERATING ROOM | Facility: HOSPITAL | Age: 74
End: 2024-01-29
Payer: MEDICARE

## 2024-01-30 ENCOUNTER — HOSPITAL ENCOUNTER (OUTPATIENT)
Facility: HOSPITAL | Age: 74
LOS: 1 days | Discharge: HOME | End: 2024-01-31
Attending: UROLOGY | Admitting: UROLOGY
Payer: MEDICARE

## 2024-01-30 ENCOUNTER — ANESTHESIA (OUTPATIENT)
Dept: OPERATING ROOM | Facility: HOSPITAL | Age: 74
End: 2024-01-30
Payer: MEDICARE

## 2024-01-30 DIAGNOSIS — C67.9 MALIGNANT NEOPLASM OF URINARY BLADDER, UNSPECIFIED SITE (MULTI): Primary | ICD-10-CM

## 2024-01-30 PROCEDURE — 52224 CYSTOSCOPY AND TREATMENT: CPT | Performed by: UROLOGY

## 2024-01-30 PROCEDURE — A4217 STERILE WATER/SALINE, 500 ML: HCPCS

## 2024-01-30 PROCEDURE — 7100000002 HC RECOVERY ROOM TIME - EACH INCREMENTAL 1 MINUTE: Performed by: UROLOGY

## 2024-01-30 PROCEDURE — 3700000002 HC GENERAL ANESTHESIA TIME - EACH INCREMENTAL 1 MINUTE: Performed by: UROLOGY

## 2024-01-30 PROCEDURE — 2720000007 HC OR 272 NO HCPCS: Performed by: UROLOGY

## 2024-01-30 PROCEDURE — 3700000001 HC GENERAL ANESTHESIA TIME - INITIAL BASE CHARGE: Performed by: UROLOGY

## 2024-01-30 PROCEDURE — 2500000004 HC RX 250 GENERAL PHARMACY W/ HCPCS (ALT 636 FOR OP/ED)

## 2024-01-30 PROCEDURE — 88307 TISSUE EXAM BY PATHOLOGIST: CPT | Mod: TC,SUR | Performed by: UROLOGY

## 2024-01-30 PROCEDURE — A52204 PR CYSTOURETHROSCOPY,BIOPSY: Performed by: ANESTHESIOLOGY

## 2024-01-30 PROCEDURE — 99100 ANES PT EXTEME AGE<1 YR&>70: CPT | Performed by: ANESTHESIOLOGY

## 2024-01-30 PROCEDURE — A4217 STERILE WATER/SALINE, 500 ML: HCPCS | Performed by: UROLOGY

## 2024-01-30 PROCEDURE — 7100000001 HC RECOVERY ROOM TIME - INITIAL BASE CHARGE: Performed by: UROLOGY

## 2024-01-30 PROCEDURE — 88307 TISSUE EXAM BY PATHOLOGIST: CPT | Performed by: STUDENT IN AN ORGANIZED HEALTH CARE EDUCATION/TRAINING PROGRAM

## 2024-01-30 PROCEDURE — 2500000004 HC RX 250 GENERAL PHARMACY W/ HCPCS (ALT 636 FOR OP/ED): Performed by: ANESTHESIOLOGY

## 2024-01-30 PROCEDURE — 2500000005 HC RX 250 GENERAL PHARMACY W/O HCPCS

## 2024-01-30 PROCEDURE — 2500000004 HC RX 250 GENERAL PHARMACY W/ HCPCS (ALT 636 FOR OP/ED): Performed by: UROLOGY

## 2024-01-30 PROCEDURE — 2500000001 HC RX 250 WO HCPCS SELF ADMINISTERED DRUGS (ALT 637 FOR MEDICARE OP): Performed by: ANESTHESIOLOGY

## 2024-01-30 PROCEDURE — 3600000003 HC OR TIME - INITIAL BASE CHARGE - PROCEDURE LEVEL THREE: Performed by: UROLOGY

## 2024-01-30 PROCEDURE — 3600000008 HC OR TIME - EACH INCREMENTAL 1 MINUTE - PROCEDURE LEVEL THREE: Performed by: UROLOGY

## 2024-01-30 PROCEDURE — 2500000001 HC RX 250 WO HCPCS SELF ADMINISTERED DRUGS (ALT 637 FOR MEDICARE OP)

## 2024-01-30 PROCEDURE — 1100000001 HC PRIVATE ROOM DAILY

## 2024-01-30 PROCEDURE — 2500000004 HC RX 250 GENERAL PHARMACY W/ HCPCS (ALT 636 FOR OP/ED): Performed by: STUDENT IN AN ORGANIZED HEALTH CARE EDUCATION/TRAINING PROGRAM

## 2024-01-30 RX ORDER — SODIUM CHLORIDE 9 MG/ML
100 INJECTION, SOLUTION INTRAVENOUS CONTINUOUS
Status: DISCONTINUED | OUTPATIENT
Start: 2024-01-30 | End: 2024-01-30

## 2024-01-30 RX ORDER — METOCLOPRAMIDE HYDROCHLORIDE 5 MG/ML
10 INJECTION INTRAMUSCULAR; INTRAVENOUS ONCE AS NEEDED
Status: DISCONTINUED | OUTPATIENT
Start: 2024-01-30 | End: 2024-01-30 | Stop reason: HOSPADM

## 2024-01-30 RX ORDER — SODIUM CHLORIDE, SODIUM LACTATE, POTASSIUM CHLORIDE, CALCIUM CHLORIDE 600; 310; 30; 20 MG/100ML; MG/100ML; MG/100ML; MG/100ML
INJECTION, SOLUTION INTRAVENOUS CONTINUOUS PRN
Status: DISCONTINUED | OUTPATIENT
Start: 2024-01-30 | End: 2024-01-30

## 2024-01-30 RX ORDER — ACETAMINOPHEN 325 MG/1
650 TABLET ORAL EVERY 4 HOURS PRN
Status: DISCONTINUED | OUTPATIENT
Start: 2024-01-30 | End: 2024-01-30 | Stop reason: HOSPADM

## 2024-01-30 RX ORDER — ONDANSETRON HYDROCHLORIDE 2 MG/ML
INJECTION, SOLUTION INTRAVENOUS AS NEEDED
Status: DISCONTINUED | OUTPATIENT
Start: 2024-01-30 | End: 2024-01-30

## 2024-01-30 RX ORDER — HYDRALAZINE HYDROCHLORIDE 20 MG/ML
5 INJECTION INTRAMUSCULAR; INTRAVENOUS EVERY 30 MIN PRN
Status: DISCONTINUED | OUTPATIENT
Start: 2024-01-30 | End: 2024-01-30 | Stop reason: HOSPADM

## 2024-01-30 RX ORDER — HEPARIN SODIUM 5000 [USP'U]/ML
5000 INJECTION, SOLUTION INTRAVENOUS; SUBCUTANEOUS EVERY 8 HOURS
Status: CANCELLED | OUTPATIENT
Start: 2024-01-30

## 2024-01-30 RX ORDER — PHENAZOPYRIDINE HYDROCHLORIDE 200 MG/1
200 TABLET, FILM COATED ORAL
Qty: 9 TABLET | Refills: 0 | Status: SHIPPED | OUTPATIENT
Start: 2024-01-30 | End: 2024-02-02

## 2024-01-30 RX ORDER — LOSARTAN POTASSIUM 50 MG/1
50 TABLET ORAL DAILY
Status: DISCONTINUED | OUTPATIENT
Start: 2024-01-30 | End: 2024-01-31 | Stop reason: HOSPADM

## 2024-01-30 RX ORDER — DEXAMETHASONE SODIUM PHOSPHATE 4 MG/ML
INJECTION, SOLUTION INTRA-ARTICULAR; INTRALESIONAL; INTRAMUSCULAR; INTRAVENOUS; SOFT TISSUE AS NEEDED
Status: DISCONTINUED | OUTPATIENT
Start: 2024-01-30 | End: 2024-01-30

## 2024-01-30 RX ORDER — LABETALOL HYDROCHLORIDE 5 MG/ML
5 INJECTION, SOLUTION INTRAVENOUS ONCE AS NEEDED
Status: DISCONTINUED | OUTPATIENT
Start: 2024-01-30 | End: 2024-01-30 | Stop reason: HOSPADM

## 2024-01-30 RX ORDER — LIDOCAINE HYDROCHLORIDE 20 MG/ML
INJECTION, SOLUTION INFILTRATION; PERINEURAL AS NEEDED
Status: DISCONTINUED | OUTPATIENT
Start: 2024-01-30 | End: 2024-01-30

## 2024-01-30 RX ORDER — PROPOFOL 10 MG/ML
INJECTION, EMULSION INTRAVENOUS AS NEEDED
Status: DISCONTINUED | OUTPATIENT
Start: 2024-01-30 | End: 2024-01-30

## 2024-01-30 RX ORDER — CHLORHEXIDINE GLUCONATE 40 MG/ML
SOLUTION TOPICAL DAILY PRN
Status: DISCONTINUED | OUTPATIENT
Start: 2024-01-30 | End: 2024-01-30 | Stop reason: HOSPADM

## 2024-01-30 RX ORDER — ONDANSETRON HYDROCHLORIDE 2 MG/ML
4 INJECTION, SOLUTION INTRAVENOUS ONCE AS NEEDED
Status: DISCONTINUED | OUTPATIENT
Start: 2024-01-30 | End: 2024-01-30 | Stop reason: HOSPADM

## 2024-01-30 RX ORDER — OXYCODONE HYDROCHLORIDE 5 MG/1
5 TABLET ORAL EVERY 4 HOURS PRN
Status: DISCONTINUED | OUTPATIENT
Start: 2024-01-30 | End: 2024-01-30 | Stop reason: HOSPADM

## 2024-01-30 RX ORDER — HYDROMORPHONE HYDROCHLORIDE 1 MG/ML
0.2 INJECTION, SOLUTION INTRAMUSCULAR; INTRAVENOUS; SUBCUTANEOUS EVERY 5 MIN PRN
Status: DISCONTINUED | OUTPATIENT
Start: 2024-01-30 | End: 2024-01-30 | Stop reason: HOSPADM

## 2024-01-30 RX ORDER — ATORVASTATIN CALCIUM 20 MG/1
20 TABLET, FILM COATED ORAL DAILY
Status: DISCONTINUED | OUTPATIENT
Start: 2024-01-30 | End: 2024-01-31 | Stop reason: HOSPADM

## 2024-01-30 RX ORDER — HYDROMORPHONE HYDROCHLORIDE 1 MG/ML
0.5 INJECTION, SOLUTION INTRAMUSCULAR; INTRAVENOUS; SUBCUTANEOUS EVERY 5 MIN PRN
Status: DISCONTINUED | OUTPATIENT
Start: 2024-01-30 | End: 2024-01-30 | Stop reason: HOSPADM

## 2024-01-30 RX ORDER — POLYETHYLENE GLYCOL 3350 17 G/17G
17 POWDER, FOR SOLUTION ORAL DAILY
Status: DISCONTINUED | OUTPATIENT
Start: 2024-01-30 | End: 2024-01-31 | Stop reason: HOSPADM

## 2024-01-30 RX ORDER — CEFAZOLIN 1 G/1
INJECTION, POWDER, FOR SOLUTION INTRAVENOUS AS NEEDED
Status: DISCONTINUED | OUTPATIENT
Start: 2024-01-30 | End: 2024-01-30

## 2024-01-30 RX ORDER — METHOCARBAMOL 100 MG/ML
1000 INJECTION, SOLUTION INTRAMUSCULAR; INTRAVENOUS ONCE
Status: COMPLETED | OUTPATIENT
Start: 2024-01-30 | End: 2024-01-30

## 2024-01-30 RX ORDER — CEFAZOLIN SODIUM 2 G/100ML
2 INJECTION, SOLUTION INTRAVENOUS ONCE
Status: DISCONTINUED | OUTPATIENT
Start: 2024-01-30 | End: 2024-01-30 | Stop reason: HOSPADM

## 2024-01-30 RX ORDER — OXYCODONE HYDROCHLORIDE 5 MG/1
5 TABLET ORAL EVERY 6 HOURS PRN
Qty: 4 TABLET | Refills: 0 | Status: SHIPPED | OUTPATIENT
Start: 2024-01-30 | End: 2024-06-03 | Stop reason: ALTCHOICE

## 2024-01-30 RX ORDER — TAMSULOSIN HYDROCHLORIDE 0.4 MG/1
0.4 CAPSULE ORAL DAILY
Status: DISCONTINUED | OUTPATIENT
Start: 2024-01-30 | End: 2024-01-31 | Stop reason: HOSPADM

## 2024-01-30 RX ORDER — DIPHENHYDRAMINE HYDROCHLORIDE 50 MG/ML
12.5 INJECTION INTRAMUSCULAR; INTRAVENOUS ONCE AS NEEDED
Status: DISCONTINUED | OUTPATIENT
Start: 2024-01-30 | End: 2024-01-30 | Stop reason: HOSPADM

## 2024-01-30 RX ORDER — FENTANYL CITRATE 50 UG/ML
INJECTION, SOLUTION INTRAMUSCULAR; INTRAVENOUS AS NEEDED
Status: DISCONTINUED | OUTPATIENT
Start: 2024-01-30 | End: 2024-01-30

## 2024-01-30 RX ORDER — SODIUM CHLORIDE 0.9 G/100ML
3000 IRRIGANT IRRIGATION EVERY 4 HOURS
Status: DISCONTINUED | OUTPATIENT
Start: 2024-01-30 | End: 2024-01-31 | Stop reason: HOSPADM

## 2024-01-30 RX ORDER — OXYCODONE HYDROCHLORIDE 5 MG/1
10 TABLET ORAL EVERY 4 HOURS PRN
Status: DISCONTINUED | OUTPATIENT
Start: 2024-01-30 | End: 2024-01-30 | Stop reason: HOSPADM

## 2024-01-30 RX ORDER — ACETAMINOPHEN 325 MG/1
650 TABLET ORAL EVERY 4 HOURS PRN
Status: DISCONTINUED | OUTPATIENT
Start: 2024-01-30 | End: 2024-01-31 | Stop reason: HOSPADM

## 2024-01-30 RX ORDER — ALBUTEROL SULFATE 0.83 MG/ML
2.5 SOLUTION RESPIRATORY (INHALATION) ONCE AS NEEDED
Status: DISCONTINUED | OUTPATIENT
Start: 2024-01-30 | End: 2024-01-30 | Stop reason: HOSPADM

## 2024-01-30 RX ORDER — SODIUM CHLORIDE 0.9 G/100ML
IRRIGANT IRRIGATION AS NEEDED
Status: DISCONTINUED | OUTPATIENT
Start: 2024-01-30 | End: 2024-01-30 | Stop reason: HOSPADM

## 2024-01-30 RX ORDER — LIDOCAINE HYDROCHLORIDE 10 MG/ML
0.1 INJECTION, SOLUTION EPIDURAL; INFILTRATION; INTRACAUDAL; PERINEURAL ONCE
Status: DISCONTINUED | OUTPATIENT
Start: 2024-01-30 | End: 2024-01-30 | Stop reason: HOSPADM

## 2024-01-30 RX ORDER — ACETAMINOPHEN 500 MG
1000 TABLET ORAL EVERY 6 HOURS PRN
Qty: 120 TABLET | Refills: 0 | Status: SHIPPED | OUTPATIENT
Start: 2024-01-30 | End: 2024-02-29

## 2024-01-30 RX ORDER — SODIUM CHLORIDE, SODIUM LACTATE, POTASSIUM CHLORIDE, CALCIUM CHLORIDE 600; 310; 30; 20 MG/100ML; MG/100ML; MG/100ML; MG/100ML
50 INJECTION, SOLUTION INTRAVENOUS CONTINUOUS
Status: DISCONTINUED | OUTPATIENT
Start: 2024-01-30 | End: 2024-01-31

## 2024-01-30 RX ORDER — SODIUM CHLORIDE, SODIUM LACTATE, POTASSIUM CHLORIDE, CALCIUM CHLORIDE 600; 310; 30; 20 MG/100ML; MG/100ML; MG/100ML; MG/100ML
100 INJECTION, SOLUTION INTRAVENOUS CONTINUOUS
Status: DISCONTINUED | OUTPATIENT
Start: 2024-01-30 | End: 2024-01-30

## 2024-01-30 RX ORDER — PHENAZOPYRIDINE HYDROCHLORIDE 100 MG/1
100 TABLET, FILM COATED ORAL 3 TIMES DAILY
Status: DISCONTINUED | OUTPATIENT
Start: 2024-01-30 | End: 2024-01-31 | Stop reason: HOSPADM

## 2024-01-30 RX ADMIN — FENTANYL CITRATE 100 MCG: 50 INJECTION, SOLUTION INTRAMUSCULAR; INTRAVENOUS at 13:25

## 2024-01-30 RX ADMIN — SODIUM CHLORIDE 3000 ML: 900 IRRIGANT IRRIGATION at 20:00

## 2024-01-30 RX ADMIN — ONDANSETRON 4 MG: 2 INJECTION, SOLUTION INTRAMUSCULAR; INTRAVENOUS at 14:07

## 2024-01-30 RX ADMIN — CEFAZOLIN 2 G: 330 INJECTION, POWDER, FOR SOLUTION INTRAMUSCULAR; INTRAVENOUS at 13:40

## 2024-01-30 RX ADMIN — OXYCODONE HYDROCHLORIDE 5 MG: 5 TABLET ORAL at 17:54

## 2024-01-30 RX ADMIN — METHOCARBAMOL 1000 MG: 1000 INJECTION, SOLUTION INTRAMUSCULAR; INTRAVENOUS at 16:31

## 2024-01-30 RX ADMIN — SODIUM CHLORIDE, POTASSIUM CHLORIDE, SODIUM LACTATE AND CALCIUM CHLORIDE 100 ML/HR: 600; 310; 30; 20 INJECTION, SOLUTION INTRAVENOUS at 14:30

## 2024-01-30 RX ADMIN — DEXAMETHASONE SODIUM PHOSPHATE 8 MG: 4 INJECTION, SOLUTION INTRAMUSCULAR; INTRAVENOUS at 13:43

## 2024-01-30 RX ADMIN — PROPOFOL 200 MG: 10 INJECTION, EMULSION INTRAVENOUS at 13:24

## 2024-01-30 RX ADMIN — SODIUM CHLORIDE, POTASSIUM CHLORIDE, SODIUM LACTATE AND CALCIUM CHLORIDE 50 ML/HR: 600; 310; 30; 20 INJECTION, SOLUTION INTRAVENOUS at 18:15

## 2024-01-30 RX ADMIN — LIDOCAINE HYDROCHLORIDE 100 MG: 20 INJECTION, SOLUTION INFILTRATION; PERINEURAL at 13:25

## 2024-01-30 RX ADMIN — PHENAZOPYRIDINE 100 MG: 100 TABLET ORAL at 17:37

## 2024-01-30 RX ADMIN — SODIUM CHLORIDE, POTASSIUM CHLORIDE, SODIUM LACTATE AND CALCIUM CHLORIDE: 600; 310; 30; 20 INJECTION, SOLUTION INTRAVENOUS at 13:23

## 2024-01-30 SDOH — SOCIAL STABILITY: SOCIAL INSECURITY: DOES ANYONE TRY TO KEEP YOU FROM HAVING/CONTACTING OTHER FRIENDS OR DOING THINGS OUTSIDE YOUR HOME?: NO

## 2024-01-30 SDOH — SOCIAL STABILITY: SOCIAL INSECURITY: WERE YOU ABLE TO COMPLETE ALL THE BEHAVIORAL HEALTH SCREENINGS?: YES

## 2024-01-30 SDOH — SOCIAL STABILITY: SOCIAL INSECURITY: ABUSE: ADULT

## 2024-01-30 SDOH — SOCIAL STABILITY: SOCIAL INSECURITY: DO YOU FEEL ANYONE HAS EXPLOITED OR TAKEN ADVANTAGE OF YOU FINANCIALLY OR OF YOUR PERSONAL PROPERTY?: NO

## 2024-01-30 SDOH — SOCIAL STABILITY: SOCIAL INSECURITY: ARE YOU OR HAVE YOU BEEN THREATENED OR ABUSED PHYSICALLY, EMOTIONALLY, OR SEXUALLY BY ANYONE?: NO

## 2024-01-30 SDOH — SOCIAL STABILITY: SOCIAL INSECURITY: HAVE YOU HAD THOUGHTS OF HARMING ANYONE ELSE?: NO

## 2024-01-30 SDOH — SOCIAL STABILITY: SOCIAL INSECURITY: DO YOU FEEL UNSAFE GOING BACK TO THE PLACE WHERE YOU ARE LIVING?: NO

## 2024-01-30 SDOH — SOCIAL STABILITY: SOCIAL INSECURITY: ARE THERE ANY APPARENT SIGNS OF INJURIES/BEHAVIORS THAT COULD BE RELATED TO ABUSE/NEGLECT?: NO

## 2024-01-30 SDOH — SOCIAL STABILITY: SOCIAL INSECURITY: HAS ANYONE EVER THREATENED TO HURT YOUR FAMILY OR YOUR PETS?: NO

## 2024-01-30 SDOH — HEALTH STABILITY: MENTAL HEALTH: CURRENT SMOKER: 0

## 2024-01-30 ASSESSMENT — COGNITIVE AND FUNCTIONAL STATUS - GENERAL
TOILETING: A LITTLE
PERSONAL GROOMING: A LITTLE
DRESSING REGULAR UPPER BODY CLOTHING: A LITTLE
PATIENT BASELINE BEDBOUND: NO
MOVING TO AND FROM BED TO CHAIR: A LITTLE
MOBILITY SCORE: 18
DAILY ACTIVITIY SCORE: 18
EATING MEALS: A LITTLE
DRESSING REGULAR LOWER BODY CLOTHING: A LITTLE
MOVING FROM LYING ON BACK TO SITTING ON SIDE OF FLAT BED WITH BEDRAILS: A LITTLE
TURNING FROM BACK TO SIDE WHILE IN FLAT BAD: A LITTLE
STANDING UP FROM CHAIR USING ARMS: A LITTLE
CLIMB 3 TO 5 STEPS WITH RAILING: A LITTLE
WALKING IN HOSPITAL ROOM: A LITTLE
HELP NEEDED FOR BATHING: A LITTLE

## 2024-01-30 ASSESSMENT — PAIN SCALES - GENERAL
PAINLEVEL_OUTOF10: 0 - NO PAIN
PAINLEVEL_OUTOF10: 3
PAINLEVEL_OUTOF10: 5 - MODERATE PAIN
PAINLEVEL_OUTOF10: 0 - NO PAIN
PAINLEVEL_OUTOF10: 4
PAINLEVEL_OUTOF10: 0 - NO PAIN
PAINLEVEL_OUTOF10: 4
PAIN_LEVEL: 0
PAINLEVEL_OUTOF10: 0 - NO PAIN
PAINLEVEL_OUTOF10: 4
PAINLEVEL_OUTOF10: 0 - NO PAIN
PAINLEVEL_OUTOF10: 0 - NO PAIN

## 2024-01-30 ASSESSMENT — PAIN - FUNCTIONAL ASSESSMENT
PAIN_FUNCTIONAL_ASSESSMENT: 0-10

## 2024-01-30 ASSESSMENT — ACTIVITIES OF DAILY LIVING (ADL)
HEARING - RIGHT EAR: HEARING AID
GROOMING: INDEPENDENT
PATIENT'S MEMORY ADEQUATE TO SAFELY COMPLETE DAILY ACTIVITIES?: YES
WALKS IN HOME: INDEPENDENT
DRESSING YOURSELF: INDEPENDENT
TOILETING: INDEPENDENT
FEEDING YOURSELF: INDEPENDENT
ADEQUATE_TO_COMPLETE_ADL: YES
BATHING: INDEPENDENT
LACK_OF_TRANSPORTATION: PATIENT DECLINED
JUDGMENT_ADEQUATE_SAFELY_COMPLETE_DAILY_ACTIVITIES: YES
HEARING - LEFT EAR: HEARING AID

## 2024-01-30 ASSESSMENT — PATIENT HEALTH QUESTIONNAIRE - PHQ9
2. FEELING DOWN, DEPRESSED OR HOPELESS: NOT AT ALL
SUM OF ALL RESPONSES TO PHQ9 QUESTIONS 1 & 2: 0
1. LITTLE INTEREST OR PLEASURE IN DOING THINGS: NOT AT ALL

## 2024-01-30 ASSESSMENT — LIFESTYLE VARIABLES
AUDIT-C TOTAL SCORE: 0
AUDIT-C TOTAL SCORE: 0
SKIP TO QUESTIONS 9-10: 1
HOW MANY STANDARD DRINKS CONTAINING ALCOHOL DO YOU HAVE ON A TYPICAL DAY: PATIENT DOES NOT DRINK
HOW OFTEN DO YOU HAVE A DRINK CONTAINING ALCOHOL: NEVER
HOW OFTEN DO YOU HAVE 6 OR MORE DRINKS ON ONE OCCASION: NEVER

## 2024-01-30 ASSESSMENT — COLUMBIA-SUICIDE SEVERITY RATING SCALE - C-SSRS
2. HAVE YOU ACTUALLY HAD ANY THOUGHTS OF KILLING YOURSELF?: NO
6. HAVE YOU EVER DONE ANYTHING, STARTED TO DO ANYTHING, OR PREPARED TO DO ANYTHING TO END YOUR LIFE?: NO
1. IN THE PAST MONTH, HAVE YOU WISHED YOU WERE DEAD OR WISHED YOU COULD GO TO SLEEP AND NOT WAKE UP?: NO

## 2024-01-30 NOTE — ANESTHESIA PROCEDURE NOTES
Airway  Date/Time: 1/30/2024 1:25 PM  Urgency: elective    Airway not difficult    Staffing  Performed: resident   Authorized by: Peggy De Los Santos MD    Performed by: Andre Monroy MD  Patient location during procedure: OR    Indications and Patient Condition  Indications for airway management: anesthesia  Spontaneous Ventilation: absent  Sedation level: deep  Preoxygenated: yes  Patient position: sniffing  Mask difficulty assessment: 0 - not attempted    Final Airway Details  Final airway type: supraglottic airway      Successful airway: Size 5     Number of attempts at approach: 1

## 2024-01-30 NOTE — ANESTHESIA PREPROCEDURE EVALUATION
"Patient: Bernabe Fletcher \"Robson\"    Procedure Information       Date/Time: 01/30/24 1115    Procedure: Cystoscopy w/Bladder Biopsy - need flexible scope    Location: [facility]  / [facility] OR    Surgeons: Kuldeep Baer MD            Relevant Problems   Cardiovascular   (+) Benign essential hypertension   (+) Mixed hyperlipidemia   (+) Sinus bradycardia      /Renal   (+) Bilateral renal cysts      Musculoskeletal   (+) Hip osteoarthritis   (+) Knee osteoarthritis      Eyes, Ears, Nose, and Throat   (+) Hearing loss       Chemistry    Lab Results   Component Value Date/Time     01/22/2024 0856    K 4.4 01/22/2024 0856     (H) 01/22/2024 0856    CO2 28 01/22/2024 0856    BUN 18 01/22/2024 0856    CREATININE 0.96 01/22/2024 0856    Lab Results   Component Value Date/Time    CALCIUM 9.0 01/22/2024 0856    ALKPHOS 59 08/21/2020 0916    AST 20 08/21/2020 0916    ALT 14 08/21/2020 0916    BILITOT 1.1 08/21/2020 0916          Lab Results   Component Value Date/Time    WBC 4.9 01/22/2024 0856    HGB 16.1 01/22/2024 0856    HCT 49.6 01/22/2024 0856     01/22/2024 0856     Lab Results   Component Value Date/Time    PROTIME 12.1 01/22/2024 0856    INR 1.1 01/22/2024 0856     No results found for this or any previous visit (from the past 4464 hour(s)).  No results found for this or any previous visit from the past 1095 days.     Clinical information reviewed:                   NPO Detail:  No data recorded     Physical Exam    Airway  Mallampati: III  TM distance: >3 FB  Neck ROM: full     Cardiovascular    Dental    Pulmonary    Abdominal        Anesthesia Plan    History of general anesthesia?: yes  History of complications of general anesthesia?: no    ASA 3     general   (He exercises 3 times / week ,no SOB ,no CP)  The patient is not a current smoker.  Patient did not smoke on day of procedure.    intravenous induction   Anesthetic plan and risks discussed with patient.    Plan discussed with " resident.

## 2024-01-30 NOTE — DISCHARGE INSTRUCTIONS
DEPARTMENT OF UROLOGY  DISCHARGE INSTRUCTIONS -- Transurethral resection of bladder tumor (Bladder biopsy)  Outpatient Surgery    C O N F I D E N T I A L   I N F O R M A T I O N    Bernabe Fletcher      Call [phone] during regular daytime business hours (8:00 am - 5:00 pm) and after 5:00 pm and ask for the Urology resident with any questions or concerns.      If it is a life-threatening situation, proceed to the nearest emergency department.         You have a follow up appointment with Dr. Kuldeep Baer on February 15 th, 2024 at 2:30 pm at [facility] [address], [place] [address]      Thank you for the opportunity to care for you today.  Your health and healing are very important to us.  We hope we made you feel as comfortable as possible and are committed to your recovery and continued well-being.      The following is a brief overview of your transurethral bladder tumor resection today. Some of the information contained on this summary may be confidential.  This information should be kept in your records and should be shared with your regular doctor.      Procedure performed: bladder tumor resection/biopsy  Pending results:   pathology of tissue taken from your bladder (we will go over these results at your post-operative appointment.)    What to Expect During your Recovery and Home Care  Anesthesia Side Effects   You received anesthesia today.  You may feel sleepy, tired, or have a sore throat.   You may also feel drowsiness, dizziness, or inability to think clearly.  For your safety, do not drive, drink alcoholic beverages, take any unprescribed medication or make any important decisions for 24 hours.  A responsible adult should be with you for 24 hours.        Activity and Recovery    No heavy lifting over ten pounds. Limit activity if you have a urinary catheter in place. Avoid activities that would cause pulling or tugging on your catheter.    Do not drive or operate  heavy machinery while taking narcotic pain medications as these medications can alter perception, impair judgement, and slow reaction times.      Pain Control  Unfortunately, you may experience pain after your procedure.  Adequate management can include alternative measures to help ease your pain and can include over the counter Tylenol or oxycodone can be taken as prescribed as needed for breakthrough pain. Do not take more than 4,000mg of Tylenol in a 24-hour period.      Oxycodone is a narcotic and can become addictive and may also induce constipation.  Please take stool softeners when taking this medication to prevent constipation.    You may also experience bladder spasms due to the catheter. Ditropan may be prescribed to help alleviate this problem.    Nausea/Vomiting   Clear liquids are best tolerated at first. Start slow, advance your diet as tolerated to normal foods. Avoid spicy, greasy, heavy foods at first. Also, you may feel nauseous or like you need to vomit if you take any type of medication on an empty stomach.  Call your physician if you are unable to eat or drink and have persistent vomiting.    Signs of Bleeding   You are going to have some blood in your urine. Your urine will be light pink to yellow. If you have a catheter you always want to look at the urine in the tubing of your catheter and not in the large urine collection bag to check for bleeding. If urine becomes thick dark mayra red, has large clots or stops draining, please notify your physician.    Treatment/wound care:   Keep area(s) clean and dry. Clean around insertion site of catheter daily with mild soap and water.  It is okay to shower 24 hours after time of surgery.    Do not submerge your catheter in standing water until seen for follow up appointment (no tub bathing, swimming, or hot tubs).      Signs of Infection  Signs of infection can include fever, drainage(green/yellow), chills, burning sensation with passing of urine,  catheter leakage, or severe abdominal pain.  If you see any of these occur, please contact your doctor's office at [phone].  Any fever higher than 100.4, especially if associated with an ill feeling, abdominal pain, chills, or nausea should be reported to your surgeon.          Assist in bowel movements/urination  Increase fiber in diet  Increase water (6 to 8 glasses)  Increase walking   Urination should occur within 6 hours of anesthesia  If you have tried these methods and your bladder still feels full and you cannot use the bathroom, please go to your nearest Emergency room.

## 2024-01-30 NOTE — ANESTHESIA POSTPROCEDURE EVALUATION
"Patient: Bernabe Fletcher \"Robson\"    Procedure Summary       Date: 01/30/24 Room / Location: [facility] OR 02 / [facility]    Anesthesia Start: 1321 Anesthesia Stop: 1420    Procedure: Cystoscopy w/Bladder Biopsy, TURPT/TURBT Diagnosis:       Malignant neoplasm of urinary bladder, unspecified site (CMS/HCC)      (Malignant neoplasm of urinary bladder, unspecified site (CMS/HCC) [C67.9])    Surgeons: Kuldeep Baer MD Responsible Provider: Peggy De Los Santos MD    Anesthesia Type: general ASA Status: 3            Anesthesia Type: general    Vitals Value Taken Time   /79 01/30/24 1420   Temp 36.3 01/30/24 1420   Pulse 73 01/30/24 1420   Resp 14 01/30/24 1420   SpO2 97% 01/30/24 1420       Anesthesia Post Evaluation    Patient location during evaluation: PACU  Patient participation: complete - patient participated  Level of consciousness: awake  Pain score: 0  Pain management: adequate  Airway patency: patent  Cardiovascular status: acceptable  Respiratory status: acceptable  Hydration status: acceptable  Postoperative Nausea and Vomiting: none        There were no known notable events for this encounter.    "

## 2024-01-30 NOTE — OP NOTE
"Cystoscopy w/Bladder Biopsy, TURPT/TURBT Operative Note     Date: 2024  OR Location: [facility] OR    Name: Bernabe Fletcher \"Lu", : 1950, Age: 73 y.o., MRN: [id number], Sex: male    Diagnosis  Pre-op Diagnosis     * Malignant neoplasm of urinary bladder, unspecified site (CMS/HCC) [C67.9] Post-op Diagnosis     * Malignant neoplasm of urinary bladder, unspecified site (CMS/HCC) [C67.9]     Procedures  Cystoscopy w/Bladder Biopsy, TURPT/TURBT  38823 - IN CYSTOURETHROSCOPY WITH BIOPSY      Surgeons      * Kuldeep Baer - Primary    Resident/Fellow/Other Assistant:  Surgeon(s) and Role:     * Alda Davila MD - Resident - Assisting    Procedure Summary  Anesthesia: General  ASA: III  Anesthesia Staff: Anesthesiologist: Peggy De Los Santos MD  Anesthesia Resident: Andre Monroy MD  Estimated Blood Loss: 2mL  Intra-op Medications:   Administrations occurring from 1300 to 1345 on 24:   Medication Name Total Dose   sodium chloride 0.9 % irrigation solution 1,000 mL              Anesthesia Record               Intraprocedure I/O Totals          Intake    LR infusion 300.00 mL    Total Intake 300 mL          Specimen:   ID Type Source Tests Collected by Time   1 : BLADDER TUMOR Tissue SOFT TISSUE MASS RESECTION SURGICAL PATHOLOGY EXAM Kuldeep Baer MD 2024 1407        Staff:   Circulator: Kelsea Sin RN; Isabella Deng RN; Zarina Medina RN  Scrub Person: Mohsne Briggs RN; Snehal Cooper; Princess Davis RN         Drains and/or Catheters:   Urethral Catheter Latex 20 Fr. (Active)         Findings: plaque on posterior aspect of prostate    Indications: Robson Fletcher is an 73 y.o. male who is having surgery for Malignant neoplasm of urinary bladder, unspecified site (CMS/HCC) [C67.9].     After obtaining informed consent, patient was brought to the operating room placed in supine position on the operating room table.  A timeout was performed and all were in agreement.  Patient underwent general " anesthesia without complication was repositioned in dorsal lithotomy.  He was prepped and draped in the usual sterile fashion.  A 26 Martiniquais resectoscope was used to perform cystourethroscopy.  Urethra was normal.  Very enlarged trilobar prostate was noted with a large intravesical median lobe ball valving the bladder neck.  We performed surveillance cystoscopy and no bladder tumors were seen.  Consistent with office cystoscopy we were required to place a flexible cystoscope on retroflexion in order to see a white appearing plaque on the posterior aspect of the right side of the prostate.  We replaced our resectoscope and systematically resected the right posterior aspect of the intravesical median lobe to excise the plaque.  These chips were sent off the field for pathology.  Meticulous hemostasis was obtained.  The prostate was very friable and so we placed a 22 Martiniquais three-way catheter and started CBI.  This cleared quickly.  Patient was woken from anesthesia and transferred to the PACU in stable condition.      Complications:  None; patient tolerated the procedure well.    Disposition: PACU - hemodynamically stable.  Condition: stable       Attending Attestation: I was present and scrubbed for the entire procedure.    Kuldeep Baer  Phone Number: [phone]

## 2024-01-31 VITALS
TEMPERATURE: 97.5 F | SYSTOLIC BLOOD PRESSURE: 144 MMHG | HEIGHT: 74 IN | HEART RATE: 69 BPM | BODY MASS INDEX: 33.64 KG/M2 | RESPIRATION RATE: 18 BRPM | OXYGEN SATURATION: 97 % | DIASTOLIC BLOOD PRESSURE: 77 MMHG | WEIGHT: 262.13 LBS

## 2024-01-31 LAB
ANION GAP SERPL CALC-SCNC: 13 MMOL/L (ref 10–20)
BUN SERPL-MCNC: 17 MG/DL (ref 6–23)
CALCIUM SERPL-MCNC: 8.6 MG/DL (ref 8.6–10.6)
CHLORIDE SERPL-SCNC: 108 MMOL/L (ref 98–107)
CO2 SERPL-SCNC: 22 MMOL/L (ref 21–32)
CREAT SERPL-MCNC: 0.81 MG/DL (ref 0.5–1.3)
EGFRCR SERPLBLD CKD-EPI 2021: >90 ML/MIN/1.73M*2
ERYTHROCYTE [DISTWIDTH] IN BLOOD BY AUTOMATED COUNT: 13.1 % (ref 11.5–14.5)
GLUCOSE SERPL-MCNC: 130 MG/DL (ref 74–99)
HCT VFR BLD AUTO: 44 % (ref 41–52)
HGB BLD-MCNC: 14.5 G/DL (ref 13.5–17.5)
MCH RBC QN AUTO: 29.2 PG (ref 26–34)
MCHC RBC AUTO-ENTMCNC: 33 G/DL (ref 32–36)
MCV RBC AUTO: 89 FL (ref 80–100)
NRBC BLD-RTO: 0 /100 WBCS (ref 0–0)
PLATELET # BLD AUTO: 192 X10*3/UL (ref 150–450)
POTASSIUM SERPL-SCNC: 4.2 MMOL/L (ref 3.5–5.3)
RBC # BLD AUTO: 4.97 X10*6/UL (ref 4.5–5.9)
SODIUM SERPL-SCNC: 139 MMOL/L (ref 136–145)
WBC # BLD AUTO: 8.3 X10*3/UL (ref 4.4–11.3)

## 2024-01-31 PROCEDURE — A4217 STERILE WATER/SALINE, 500 ML: HCPCS | Mod: MUE

## 2024-01-31 PROCEDURE — 36415 COLL VENOUS BLD VENIPUNCTURE: CPT

## 2024-01-31 PROCEDURE — 7100000011 HC EXTENDED STAY RECOVERY HOURLY - NURSING UNIT

## 2024-01-31 PROCEDURE — 2500000004 HC RX 250 GENERAL PHARMACY W/ HCPCS (ALT 636 FOR OP/ED)

## 2024-01-31 PROCEDURE — 80048 BASIC METABOLIC PNL TOTAL CA: CPT

## 2024-01-31 PROCEDURE — 85027 COMPLETE CBC AUTOMATED: CPT

## 2024-01-31 PROCEDURE — 2500000001 HC RX 250 WO HCPCS SELF ADMINISTERED DRUGS (ALT 637 FOR MEDICARE OP)

## 2024-01-31 RX ADMIN — SODIUM CHLORIDE 3000 ML: 900 IRRIGANT IRRIGATION at 00:00

## 2024-01-31 RX ADMIN — PHENAZOPYRIDINE 100 MG: 100 TABLET ORAL at 00:13

## 2024-01-31 RX ADMIN — PHENAZOPYRIDINE 100 MG: 100 TABLET ORAL at 08:22

## 2024-01-31 RX ADMIN — ATORVASTATIN CALCIUM 20 MG: 20 TABLET, FILM COATED ORAL at 08:22

## 2024-01-31 RX ADMIN — SODIUM CHLORIDE 3000 ML: 900 IRRIGANT IRRIGATION at 04:00

## 2024-01-31 RX ADMIN — TAMSULOSIN HYDROCHLORIDE 0.4 MG: 0.4 CAPSULE ORAL at 08:22

## 2024-01-31 RX ADMIN — ACETAMINOPHEN 650 MG: 325 TABLET ORAL at 00:13

## 2024-01-31 SDOH — ECONOMIC STABILITY: INCOME INSECURITY: IN THE LAST 12 MONTHS, WAS THERE A TIME WHEN YOU WERE NOT ABLE TO PAY THE MORTGAGE OR RENT ON TIME?: NO

## 2024-01-31 SDOH — ECONOMIC STABILITY: FOOD INSECURITY: WITHIN THE PAST 12 MONTHS, THE FOOD YOU BOUGHT JUST DIDN’T LAST AND YOU DIDN’T HAVE MONEY TO GET MORE.: NEVER TRUE

## 2024-01-31 SDOH — ECONOMIC STABILITY: HOUSING INSECURITY
IN THE LAST 12 MONTHS, WAS THERE A TIME WHEN YOU DID NOT HAVE A STEADY PLACE TO SLEEP OR SLEPT IN A SHELTER (INCLUDING NOW)?: NO

## 2024-01-31 SDOH — ECONOMIC STABILITY: HOUSING INSECURITY: IN THE PAST 12 MONTHS HAS THE ELECTRIC, GAS, OIL, OR WATER COMPANY THREATENED TO SHUT OFF SERVICES IN YOUR HOME?: NO

## 2024-01-31 SDOH — ECONOMIC STABILITY: GENERAL

## 2024-01-31 SDOH — ECONOMIC STABILITY: TRANSPORTATION INSECURITY

## 2024-01-31 SDOH — ECONOMIC STABILITY: HOUSING INSECURITY: IN THE LAST 12 MONTHS, WAS THERE A TIME WHEN YOU WERE NOT ABLE TO PAY THE MORTGAGE OR RENT ON TIME?: NO

## 2024-01-31 SDOH — ECONOMIC STABILITY: FOOD INSECURITY: WITHIN THE PAST 12 MONTHS, YOU WORRIED THAT YOUR FOOD WOULD RUN OUT BEFORE YOU GOT MONEY TO BUY MORE.: NEVER TRUE

## 2024-01-31 SDOH — ECONOMIC STABILITY: FOOD INSECURITY

## 2024-01-31 SDOH — ECONOMIC STABILITY: FOOD INSECURITY: WITHIN THE PAST 12 MONTHS, THE FOOD YOU BOUGHT JUST DIDN'T LAST AND YOU DIDN'T HAVE MONEY TO GET MORE.: NEVER TRUE

## 2024-01-31 SDOH — ECONOMIC STABILITY: TRANSPORTATION INSECURITY: IN THE PAST 12 MONTHS, HAS LACK OF TRANSPORTATION KEPT YOU FROM MEDICAL APPOINTMENTS OR FROM GETTING MEDICATIONS?: NO

## 2024-01-31 SDOH — ECONOMIC STABILITY: HOUSING INSECURITY: IN THE LAST 12 MONTHS, HOW MANY PLACES HAVE YOU LIVED?: 1

## 2024-01-31 SDOH — ECONOMIC STABILITY: TRANSPORTATION INSECURITY
IN THE PAST 12 MONTHS, HAS LACK OF TRANSPORTATION KEPT YOU FROM MEETINGS, WORK, OR FROM GETTING THINGS NEEDED FOR DAILY LIVING?: NO

## 2024-01-31 SDOH — ECONOMIC STABILITY: FOOD INSECURITY: WITHIN THE PAST 12 MONTHS, YOU WORRIED THAT YOUR FOOD WOULD RUN OUT BEFORE YOU GOT THE MONEY TO BUY MORE.: NEVER TRUE

## 2024-01-31 SDOH — ECONOMIC STABILITY: HOUSING INSECURITY

## 2024-01-31 SDOH — ECONOMIC STABILITY: TRANSPORTATION INSECURITY
IN THE PAST 12 MONTHS, HAS THE LACK OF TRANSPORTATION KEPT YOU FROM MEDICAL APPOINTMENTS OR FROM GETTING MEDICATIONS?: NO

## 2024-01-31 ASSESSMENT — ACTIVITIES OF DAILY LIVING (ADL): LACK_OF_TRANSPORTATION: NO

## 2024-01-31 ASSESSMENT — COGNITIVE AND FUNCTIONAL STATUS - GENERAL
PERSONAL GROOMING: A LITTLE
WALKING IN HOSPITAL ROOM: A LITTLE
MOBILITY SCORE: 18
MOVING TO AND FROM BED TO CHAIR: A LITTLE
EATING MEALS: A LITTLE
TURNING FROM BACK TO SIDE WHILE IN FLAT BAD: A LITTLE
MOVING FROM LYING ON BACK TO SITTING ON SIDE OF FLAT BED WITH BEDRAILS: A LITTLE
STANDING UP FROM CHAIR USING ARMS: A LITTLE
TOILETING: A LITTLE
DRESSING REGULAR UPPER BODY CLOTHING: A LITTLE
CLIMB 3 TO 5 STEPS WITH RAILING: A LITTLE
HELP NEEDED FOR BATHING: A LITTLE
DRESSING REGULAR LOWER BODY CLOTHING: A LITTLE
DAILY ACTIVITIY SCORE: 18

## 2024-01-31 ASSESSMENT — SOCIAL DETERMINANTS OF HEALTH (SDOH): IN THE PAST 12 MONTHS, HAS THE ELECTRIC, GAS, OIL, OR WATER COMPANY THREATENED TO SHUT OFF SERVICE IN YOUR HOME?: NO

## 2024-01-31 ASSESSMENT — PAIN SCALES - GENERAL: PAINLEVEL_OUTOF10: 0 - NO PAIN

## 2024-01-31 ASSESSMENT — PAIN - FUNCTIONAL ASSESSMENT
PAIN_FUNCTIONAL_ASSESSMENT: 0-10
PAIN_FUNCTIONAL_ASSESSMENT: 0-10

## 2024-01-31 NOTE — DISCHARGE SUMMARY
Discharge Diagnosis  Bladder cancer (CMS/HCC)    Issues Requiring Follow-Up  F/U after surgery     Test Results Pending At Discharge  Pending Labs       Order Current Status    Surgical Pathology Exam In process            Hospital Course    Surgeon: Kuldeep Baer      Anesthesia: General    ASA Class: III      Bernabe Fletcher is a 73 y.o. year old male with a significant pmh of BPH with 2 prior negative prostate biopsies (most recent 11/27/19), and non-invasive papillary urothelial carcinoma, high-grade, diagnosed 11/2018, s/p completion of BCG induction, and maintenance BCG 10/19 , HTN, cataract, choroidal nevus, hyperlipidemia, sinus bradycardia, ostearthritis, bilateral renal cysts who was diagnosed with non-invasive papillary urothelial carcinoma underwent TURBT/TURP on 01/30/2024 with Dr. Kuldeep Baer. The procedure went well and patient was awaken from anesthesia and transferred to the PACU in stable condition. Hancock catheter was placed as friable prostate was observed during a procedure and CBI was initiated during his presence in PACU. Patient was admitted to urology service to observe patient closely for a concern of gross hematuria with CBI on. Pyridium  100 mg was commenced to control patient's bladder spasms which discolored urine orange. VS remained stable. Post-op labs were significant for baseline hgb, normal serum creatinine, elevated blood glucose, and stable at the time of discharge. Patient was making good amounts of clear yellow urine per hancock which was removed for successful TOV. At the time of hospital discharge He was tolerating a regular diet, ambulating independently, passing flatus, having normal bowel movements and pain was well-controlled. He was discharged to home in satisfactory condition and will follow up with Dr. Kuldeep Baer MD in the outpatient clinic on 02/15/2024.     Pertinent Physical Exam At Time of Discharge  Physical Exam  General: Laying in bed. NAD.   Eyes: EOMI  ENMT: no  apparent injury, no lesions seen, MMM  Head/neck: NCAT  Cardiac: regular rate in chart  Pulm: normal respiratory effort  GI: soft, NT/ND, no masses palpated  : Voids spontaneously   Msk: FRYE  Extremities: normal extremities  Skin: warm, dry, no lesions noted  Neuro: AOx3  Psych: appropriate mood and behavior    Home Medications     Medication List      START taking these medications     acetaminophen 500 mg tablet; Commonly known as: Tylenol; Take 2 tablets   (1,000 mg) by mouth every 6 hours if needed for mild pain (1 - 3).   oxyCODONE 5 mg immediate release tablet; Commonly known as: Roxicodone;   Take 1 tablet (5 mg) by mouth every 6 hours if needed for severe pain (7 -   10) for up to 4 doses.   phenazopyridine 200 mg tablet; Commonly known as: Pyridium; Take 1   tablet (200 mg) by mouth 3 times a day with meals for 3 days.     CONTINUE taking these medications     atorvastatin 20 mg tablet; Commonly known as: Lipitor; Take 1 tablet (20   mg) by mouth once daily.   losartan 50 mg tablet; Commonly known as: Cozaar; TAKE 1 TABLET BY MOUTH   EVERY DAY   tamsulosin 0.4 mg 24 hr capsule; Commonly known as: Flomax; TAKE 1   CAPSULE BY MOUTH EVERY DAY     ASK your doctor about these medications     acetaZOLAMIDE 250 mg tablet; Commonly known as: Diamox   diclofenac 0.1 % ophthalmic solution; Commonly known as: Voltaren   moxifloxacin 0.5 % ophthalmic solution; Commonly known as: Vigamox   prednisoLONE acetate 1 % ophthalmic suspension; Commonly known as:   Pred-Forte   Sophie BCG 50 mg injection; Generic drug: BCG (live)   Xiidra 5 % dropperette; Generic drug: lifitegrast       Outpatient Follow-Up  Future Appointments   Date Time Provider Department [place]   2/13/2024  8:30 AM Marco A Franklin MD [id number] [facility]   2/15/2024  2:30 PM Kuldeep Baer MD [facility]       Armando Rausch MD, MS   Urology   Urology [id number]

## 2024-01-31 NOTE — PROGRESS NOTES
Discharge planning note:      Robson Fletcher is a 73 y.o. male on day 1 of admission presenting with Bladder cancer (CMS/HCC).      Patient discharging home with no needs today.                      Edelmira Geiger RN TCC

## 2024-01-31 NOTE — NURSING NOTE
Discharge instructions reviewed w/ pt. All questions and concerns addressed. IV removed and patient self ambulated to pt lobby

## 2024-01-31 NOTE — CARE PLAN
The patient's goals for the shift include      The clinical goals for the shift include patient will have adequate urine output      Problem: Pain  Goal: My pain/discomfort is manageable  Outcome: Progressing     Problem: Safety  Goal: Patient will be injury free during hospitalization  Outcome: Progressing  Goal: I will remain free of falls  Outcome: Progressing     Problem: Daily Care  Goal: Daily care needs are met  Outcome: Progressing     Problem: Psychosocial Needs  Goal: Demonstrates ability to cope with hospitalization/illness  Outcome: Progressing  Goal: Collaborate with me, my family, and caregiver to identify my specific goals  Outcome: Progressing     Problem: Discharge Barriers  Goal: My discharge needs are met  Outcome: Progressing

## 2024-02-06 ENCOUNTER — LAB (OUTPATIENT)
Dept: LAB | Facility: LAB | Age: 74
End: 2024-02-06
Payer: MEDICARE

## 2024-02-06 DIAGNOSIS — R35.0 URINARY FREQUENCY: ICD-10-CM

## 2024-02-06 LAB
LABORATORY COMMENT REPORT: NORMAL
PATH REPORT.FINAL DX SPEC: NORMAL
PATH REPORT.GROSS SPEC: NORMAL
PATH REPORT.RELEVANT HX SPEC: NORMAL
PATH REPORT.TOTAL CANCER: NORMAL
RESIDENT REVIEW: NORMAL

## 2024-02-06 PROCEDURE — 87086 URINE CULTURE/COLONY COUNT: CPT

## 2024-02-07 LAB — BACTERIA UR CULT: NO GROWTH

## 2024-02-13 ENCOUNTER — LAB (OUTPATIENT)
Dept: LAB | Facility: LAB | Age: 74
End: 2024-02-13
Payer: MEDICARE

## 2024-02-13 ENCOUNTER — OFFICE VISIT (OUTPATIENT)
Dept: PRIMARY CARE | Facility: CLINIC | Age: 74
End: 2024-02-13
Payer: MEDICARE

## 2024-02-13 VITALS — BODY MASS INDEX: 34.02 KG/M2 | SYSTOLIC BLOOD PRESSURE: 125 MMHG | DIASTOLIC BLOOD PRESSURE: 75 MMHG | WEIGHT: 265 LBS

## 2024-02-13 DIAGNOSIS — E78.2 MIXED HYPERLIPIDEMIA: ICD-10-CM

## 2024-02-13 DIAGNOSIS — I10 PRIMARY HYPERTENSION: Primary | ICD-10-CM

## 2024-02-13 DIAGNOSIS — M47.896 OTHER OSTEOARTHRITIS OF SPINE, LUMBAR REGION: ICD-10-CM

## 2024-02-13 LAB
ALBUMIN SERPL BCP-MCNC: 3.9 G/DL (ref 3.4–5)
ALP SERPL-CCNC: 77 U/L (ref 33–136)
ALT SERPL W P-5'-P-CCNC: 15 U/L (ref 10–52)
AST SERPL W P-5'-P-CCNC: 16 U/L (ref 9–39)
BILIRUB DIRECT SERPL-MCNC: 0.2 MG/DL (ref 0–0.3)
BILIRUB SERPL-MCNC: 1 MG/DL (ref 0–1.2)
CHOLEST SERPL-MCNC: 162 MG/DL (ref 0–199)
CHOLESTEROL/HDL RATIO: 2.8
HDLC SERPL-MCNC: 57.6 MG/DL
LDLC SERPL CALC-MCNC: 94 MG/DL
NON HDL CHOLESTEROL: 104 MG/DL (ref 0–149)
PROT SERPL-MCNC: 6.4 G/DL (ref 6.4–8.2)
TRIGL SERPL-MCNC: 50 MG/DL (ref 0–149)
VLDL: 10 MG/DL (ref 0–40)

## 2024-02-13 PROCEDURE — 1126F AMNT PAIN NOTED NONE PRSNT: CPT | Performed by: INTERNAL MEDICINE

## 2024-02-13 PROCEDURE — 80061 LIPID PANEL: CPT

## 2024-02-13 PROCEDURE — 1157F ADVNC CARE PLAN IN RCRD: CPT | Performed by: INTERNAL MEDICINE

## 2024-02-13 PROCEDURE — 3078F DIAST BP <80 MM HG: CPT | Performed by: INTERNAL MEDICINE

## 2024-02-13 PROCEDURE — 1036F TOBACCO NON-USER: CPT | Performed by: INTERNAL MEDICINE

## 2024-02-13 PROCEDURE — 3074F SYST BP LT 130 MM HG: CPT | Performed by: INTERNAL MEDICINE

## 2024-02-13 PROCEDURE — 1159F MED LIST DOCD IN RCRD: CPT | Performed by: INTERNAL MEDICINE

## 2024-02-13 PROCEDURE — 99214 OFFICE O/P EST MOD 30 MIN: CPT | Performed by: INTERNAL MEDICINE

## 2024-02-13 PROCEDURE — 80076 HEPATIC FUNCTION PANEL: CPT

## 2024-02-13 PROCEDURE — 1111F DSCHRG MED/CURRENT MED MERGE: CPT | Performed by: INTERNAL MEDICINE

## 2024-02-13 PROCEDURE — 36415 COLL VENOUS BLD VENIPUNCTURE: CPT

## 2024-02-13 NOTE — PROGRESS NOTES
Subjective   Patient ID: Robson Fletcher is a 73 y.o. male who presents for No chief complaint on file..    HPI follow-up visit had his recent urological surgeries still urinating some blood he stayed overnight and had three-way catheter apparently and is overall doing better still slightly fatigued no chest pain no shortness of breath no side effect with medication on statin    Review of Systems    Objective   There were no vitals taken for this visit.    Physical Exam  Vital signs noted alert and oriented x 3 NCAT no JVD chest clear to auscultation CV regular rate and rhythm S1-S2 LS spine relatively straight and but nontender negative straight leg raise extremities no clubbing cyanosis or edema normal distal pulses  Assessment/Plan     Impression hypertension hyperlipidemia osteoarthritis of spine  Plan he had seen his report of his CT scan showing degenerative joint disease of the lumbar spine questions about such has not had any symptoms advised on core exercise training overall good diet weight loss good water consumption there is no need for any treatment of the spine as he is asymptomatic at this time but had questions about same continue with blood pressure medication check hepatic panel lipid panel advised on cholesterol cholesterol medicine and recheck in 3 months with follow-up with urology also  [id number] [id number]

## 2024-02-14 NOTE — PROGRESS NOTES
FUV    Last seen - 1/18/24     HISTORY OF PRESENT ILLNESS:   Bernabe Fletcher is a 73 y.o. male who is being seen today to discuss surgical pathology results from TURBT on 1/30/24.     PAST MEDICAL HISTORY:  Past Medical History:   Diagnosis Date    Essential (primary) hypertension 07/05/2021    Benign essential hypertension   - BPH with 2 prior negative prostate biopsies (most recent 11/27/19)  - Non-invasive papillary urothelial carcinoma, high-grade, diagnosed 11/2018  - S/P completion of BCG induction, and maintenance 10/2019, 06/2020, and 01/22/2021, and bladder biopsy on 06/22/2021.     PAST SURGICAL HISTORY:  Past Surgical History:   Procedure Laterality Date    APPENDECTOMY  07/06/2017    Appendectomy    APPENDECTOMY  10/13/2014    Appendectomy    COLONOSCOPY  10/13/2014    Complete Colonoscopy        ALLERGIES:   No Known Allergies     MEDICATIONS:   Current Outpatient Medications   Medication Instructions    acetaminophen (TYLENOL) 1,000 mg, oral, Every 6 hours PRN    acetaZOLAMIDE (Diamox) 250 mg tablet 1 tablet, oral, 2 times daily    atorvastatin (LIPITOR) 20 mg, oral, Daily    diclofenac (Voltaren) 0.1 % ophthalmic solution 1 drop, Right Eye, 3 times daily, For 3 days before surgery, and 1 week after surgery    lifitegrast (Xiidra) 5 % dropperette ophthalmic (eye)    losartan (COZAAR) 50 mg, oral, Daily    moxifloxacin (Vigamox) 0.5 % ophthalmic solution 1 drop, Right Eye, 3 times daily, Before surgery and 3 weeks after surgery    oxyCODONE (ROXICODONE) 5 mg, oral, Every 6 hours PRN    prednisoLONE acetate (Pred-Forte) 1 % ophthalmic suspension 1 drop, Left Eye, 3 times daily, 3 weeks after surgery    tamsulosin (FLOMAX) 0.4 mg, oral, Daily        PHYSICAL EXAM:  There were no vitals taken for this visit.  Constitutional: Patient appears well-developed and well-nourished. No distress.    Pulmonary/Chest: Effort normal. No respiratory distress.   Abdominal: Soft, ND NT  :   Musculoskeletal: Normal range  "of motion.    Neurological: Alert and oriented to person, place, and time.  Psychiatric: Normal mood and affect. Behavior is normal. Thought content normal.      Labs  No results found for: \"TESTOSTERONE\"  Lab Results   Component Value Date    PSA 4.92 (H) 09/22/2023   PSA panel on 2/20/23 was 5.1 with 27% free. PSA on 06/03/2022 was 4.89 and on 05/26/2020 was 4.3 and 5.49 on 08/08/19.     No components found for: \"CBC\"  Lab Results   Component Value Date    CREATININE 0.81 01/31/2024     No components found for: \"TESTOTMS\"  No results found for: \"TESTF\"    Imaging:   - Renal CT 9/26/23 demonstrating lower pole cyst left kidney with additional probable parapelvic cyst. No solid enhancing renal mass identified. Atherosclerosis. Mild stenosis of the proximal SMA.  - Surgical pathology on 1/30/23 - Negative    Discussion:   Patient is doing well. Review pathology results, patient reassured. Reports a couple of days ago he had minor clot but has since now been cleared. Reports has NTF. He currently takes Tamsulosin 0.4mg to treat BPH sxs. Recommended daily dosing Cialis, explained it can help with urination and ED. Pt denies nitrates, explained. I also offered a referral to Dr. Greene to discuss minimally invasive procedures. Pt will try daily dosing Cialis 5mg.    Cysto schedule: Now on yearly surveillance due to no recurrences in 5 years.     Assessment:      No diagnosis found.      Bernabe Fletcher is a 73 y.o. male here for FUV     Plan:   1) Pt will follow up in 1 year for continued cystoscopic surveillance.  2) Will send daily dosing Cialis 5mg to patients pharmacy as discussed.  3) All questions and concerns were addressed. Patient verbalizes understanding and has no other questions at this time.    Scribe Attestation  By signing my name below, I, Sangeeta Farrell   attest that this documentation has been prepared under the direction and in the presence of Kuldeep Baer MD.  "

## 2024-02-15 ENCOUNTER — OFFICE VISIT (OUTPATIENT)
Dept: UROLOGY | Facility: HOSPITAL | Age: 74
End: 2024-02-15
Payer: MEDICARE

## 2024-02-15 DIAGNOSIS — C67.9 MALIGNANT NEOPLASM OF URINARY BLADDER, UNSPECIFIED SITE (MULTI): ICD-10-CM

## 2024-02-15 DIAGNOSIS — N40.1 BENIGN LOCALIZED PROSTATIC HYPERPLASIA WITH LOWER URINARY TRACT SYMPTOMS (LUTS): ICD-10-CM

## 2024-02-15 PROCEDURE — 1036F TOBACCO NON-USER: CPT | Performed by: UROLOGY

## 2024-02-15 PROCEDURE — 1157F ADVNC CARE PLAN IN RCRD: CPT | Performed by: UROLOGY

## 2024-02-15 PROCEDURE — 1111F DSCHRG MED/CURRENT MED MERGE: CPT | Performed by: UROLOGY

## 2024-02-15 PROCEDURE — 1126F AMNT PAIN NOTED NONE PRSNT: CPT | Performed by: UROLOGY

## 2024-02-15 PROCEDURE — 1159F MED LIST DOCD IN RCRD: CPT | Performed by: UROLOGY

## 2024-02-15 PROCEDURE — 99214 OFFICE O/P EST MOD 30 MIN: CPT | Performed by: UROLOGY

## 2024-02-15 RX ORDER — TADALAFIL 5 MG/1
5 TABLET ORAL DAILY
Qty: 90 TABLET | Refills: 3 | Status: SHIPPED | OUTPATIENT
Start: 2024-02-15 | End: 2025-02-09

## 2024-02-23 ENCOUNTER — OFFICE VISIT (OUTPATIENT)
Dept: PRIMARY CARE | Facility: CLINIC | Age: 74
End: 2024-02-23
Payer: MEDICARE

## 2024-02-23 VITALS — RESPIRATION RATE: 12 BRPM | HEART RATE: 76 BPM

## 2024-02-23 DIAGNOSIS — M54.50 LOW BACK PAIN, UNSPECIFIED BACK PAIN LATERALITY, UNSPECIFIED CHRONICITY, UNSPECIFIED WHETHER SCIATICA PRESENT: ICD-10-CM

## 2024-02-23 DIAGNOSIS — M79.10 MYALGIA: Primary | ICD-10-CM

## 2024-02-23 DIAGNOSIS — E78.2 MIXED HYPERLIPIDEMIA: ICD-10-CM

## 2024-02-23 PROCEDURE — 1036F TOBACCO NON-USER: CPT | Performed by: INTERNAL MEDICINE

## 2024-02-23 PROCEDURE — 99213 OFFICE O/P EST LOW 20 MIN: CPT | Performed by: INTERNAL MEDICINE

## 2024-02-23 PROCEDURE — 1157F ADVNC CARE PLAN IN RCRD: CPT | Performed by: INTERNAL MEDICINE

## 2024-02-23 PROCEDURE — 1159F MED LIST DOCD IN RCRD: CPT | Performed by: INTERNAL MEDICINE

## 2024-02-23 PROCEDURE — 1111F DSCHRG MED/CURRENT MED MERGE: CPT | Performed by: INTERNAL MEDICINE

## 2024-02-23 PROCEDURE — 1126F AMNT PAIN NOTED NONE PRSNT: CPT | Performed by: INTERNAL MEDICINE

## 2024-02-23 NOTE — PROGRESS NOTES
Subjective   Patient ID: Robson Fletcher is a 73 y.o. male who presents for No chief complaint on file..    HPI follow-up visit no chest pain no shortness of breath but developed some lower back and buttock pain left greater than right with some cramping past the knees thought it might be the statin medication his most recent laboratory results were reviewed including the efficacy of the medicine he was also started on Cialis during that time.    Review of Systems    Objective   There were no vitals taken for this visit.    Physical Exam vital signs noted alert and oriented x 3 no JVD breathing unlabored CV regular rate and rhythm LS spine normal curvature negative straight leg raise extremities no clubbing cyanosis or edema normal distal pulses left thigh normal without muscular cramping noted DTR 2+    Assessment/Plan    impression myalgias low back pain?  Hyperlipidemia  Plan okay to go off statin medicine x 2 weeks then may resume statin medicine in 2 weeks while working on back hygiene diet exercise stretches heat Tylenol if the pain does not recur upon reinitiation of the statin portending but it had gone away off of the statin may continue with the statin and/or if so may choose a different statin and/or obtain additional blood work or imaging recheck 2 to 4 weeks

## 2024-04-01 DIAGNOSIS — N40.0 BENIGN PROSTATIC HYPERPLASIA WITHOUT LOWER URINARY TRACT SYMPTOMS: Primary | ICD-10-CM

## 2024-04-01 DIAGNOSIS — G47.30 SLEEP APNEA, UNSPECIFIED TYPE: ICD-10-CM

## 2024-04-26 DIAGNOSIS — Z00.00 HEALTH CARE MAINTENANCE: ICD-10-CM

## 2024-04-27 RX ORDER — TAMSULOSIN HYDROCHLORIDE 0.4 MG/1
0.4 CAPSULE ORAL DAILY
Qty: 90 CAPSULE | Refills: 1 | Status: SHIPPED | OUTPATIENT
Start: 2024-04-27

## 2024-05-09 ENCOUNTER — OFFICE VISIT (OUTPATIENT)
Dept: ORTHOPEDIC SURGERY | Facility: CLINIC | Age: 74
End: 2024-05-09
Payer: MEDICARE

## 2024-05-09 ENCOUNTER — HOSPITAL ENCOUNTER (OUTPATIENT)
Dept: RADIOLOGY | Facility: CLINIC | Age: 74
Discharge: HOME | End: 2024-05-09
Payer: MEDICARE

## 2024-05-09 DIAGNOSIS — M25.551 RIGHT HIP PAIN: Primary | ICD-10-CM

## 2024-05-09 DIAGNOSIS — M25.551 RIGHT HIP PAIN: ICD-10-CM

## 2024-05-09 PROCEDURE — 1157F ADVNC CARE PLAN IN RCRD: CPT | Performed by: STUDENT IN AN ORGANIZED HEALTH CARE EDUCATION/TRAINING PROGRAM

## 2024-05-09 PROCEDURE — 99215 OFFICE O/P EST HI 40 MIN: CPT | Performed by: STUDENT IN AN ORGANIZED HEALTH CARE EDUCATION/TRAINING PROGRAM

## 2024-05-09 PROCEDURE — 1159F MED LIST DOCD IN RCRD: CPT | Performed by: STUDENT IN AN ORGANIZED HEALTH CARE EDUCATION/TRAINING PROGRAM

## 2024-05-09 PROCEDURE — 1125F AMNT PAIN NOTED PAIN PRSNT: CPT | Performed by: STUDENT IN AN ORGANIZED HEALTH CARE EDUCATION/TRAINING PROGRAM

## 2024-05-09 PROCEDURE — 73502 X-RAY EXAM HIP UNI 2-3 VIEWS: CPT | Mod: RIGHT SIDE | Performed by: RADIOLOGY

## 2024-05-09 PROCEDURE — 73502 X-RAY EXAM HIP UNI 2-3 VIEWS: CPT | Mod: RT

## 2024-05-09 ASSESSMENT — PAIN SCALES - GENERAL: PAINLEVEL_OUTOF10: 8

## 2024-05-09 ASSESSMENT — PAIN DESCRIPTION - DESCRIPTORS: DESCRIPTORS: ACHING

## 2024-05-09 ASSESSMENT — PAIN - FUNCTIONAL ASSESSMENT: PAIN_FUNCTIONAL_ASSESSMENT: 0-10

## 2024-05-09 NOTE — PROGRESS NOTES
LYNNETTE/TKA Related Summary           L hip: N  L knee: N  R hip: N  R knee: N  Lumbar surgery or significant pathology: N            CC/SUBJECTIVE/HPI            PCP: Marco A Franklin MD  Referring provider: Self  Occupation: Retired ()  Hobbies: Pickleball, hiking  Bernabe Fletcher is a 73 y.o. male presenting for R hip pain.  He was in his usual state of health until several weeks ago, at which time he began having right hip pain.  This has progressed over the last couple of weeks and is interrupting his sleep.    R hip  Symptoms  Pain: 7/10  Onset: atraumatic  Duration: <3mo  Location: groin, side of hip, and front thigh  Quality: dull/ache and radiate  Limitations: morning stiffness, pain after activity, limp, night pain, difficulty in/out of chair/car, and difficulty with socks/shoes/clothes  Ambulation limit due to pain: unlimited but hurts later  Other symptoms: none  Treatment  Tried: activity modification and OTCs  Most recent injection <3mo ago? na  Assistive device: none  Treatment attempted for <3mo and is partially effective            HISTORIES (System Generated and Pt Reported on Form Today)       Dental  Pt reported: No active issues     PMH  Pt reported: Denies heart/lung/kidney/liver issues, DM, stroke, seizure, bariatric surgery, anticoag, MRSA, cancer, personal/familial coagulopathies except: none in addition to below  System generated (PMH, problem list both included since EMR change caused discrepancies):   Past Medical History:   Diagnosis Date    Essential (primary) hypertension 07/05/2021    Benign essential hypertension      Patient Active Problem List   Diagnosis    Abnormal urinalysis    Benign essential hypertension    Benign hypertensive heart disease    Bladder cancer (Multi)    Blood pressure elevated without history of HTN    BPH with elevated PSA    Choroidal nevus of right eye    Combined form of age-related cataract, both eyes    Mixed hyperlipidemia    Contracture, joint,  hand    Edema    Glucose intolerance (impaired glucose tolerance)    Gross hematuria    Hearing loss    Hip osteoarthritis    Knee osteoarthritis    Left hip pain    Left knee pain    Right shoulder pain    Sinus bradycardia    Sleep apnea    Urine frequency    Bilateral renal cysts    Pseudophakia of both eyes    Malignant neoplasm of urinary bladder, unspecified site (Multi)     PSH  Pt reported: Per above.   System generated:   Past Surgical History:   Procedure Laterality Date    APPENDECTOMY  07/06/2017    Appendectomy    APPENDECTOMY  10/13/2014    Appendectomy    COLONOSCOPY  10/13/2014    Complete Colonoscopy     Family Hx  Pt reported clot/coagulopathies: none  System generated:   Family History   Problem Relation Name Age of Onset    Hypertension Father      Lymphoma Father      Heart failure Father       Social Hx  Pt reported substance use: EtOH (5 drink(s)/wk)  System generated:   Social History     Tobacco Use    Smoking status: Never    Smokeless tobacco: Never   Substance Use Topics    Alcohol use: Not Currently    Drug use: Never     Allergies  Pt reported (meds, metals): N  System generated: No Known Allergies    Current Meds  System generated:   Current Outpatient Medications:     acetaZOLAMIDE (Diamox) 250 mg tablet, Take 1 tablet (250 mg) by mouth in the morning and at bedtime., Disp: , Rfl:     atorvastatin (Lipitor) 20 mg tablet, Take 1 tablet (20 mg) by mouth once daily., Disp: 90 tablet, Rfl: 2    diclofenac (Voltaren) 0.1 % ophthalmic solution, Administer 1 drop into the right eye in the morning, at noon, and at bedtime. For 3 days before surgery, and 1 week after surgery, Disp: , Rfl:     lifitegrast (Xiidra) 5 % dropperette, Administer into affected eye(s)., Disp: , Rfl:     losartan (Cozaar) 50 mg tablet, TAKE 1 TABLET BY MOUTH EVERY DAY, Disp: 90 tablet, Rfl: 1    moxifloxacin (Vigamox) 0.5 % ophthalmic solution, Administer 1 drop into the right eye in the morning, at noon, and at  "bedtime. Before surgery and 3 weeks after surgery, Disp: , Rfl:     oxyCODONE (Roxicodone) 5 mg immediate release tablet, Take 1 tablet (5 mg) by mouth every 6 hours if needed for severe pain (7 - 10) for up to 4 doses., Disp: 4 tablet, Rfl: 0    prednisoLONE acetate (Pred-Forte) 1 % ophthalmic suspension, Administer 1 drop into the left eye in the morning, at noon, and at bedtime. 3 weeks after surgery, Disp: , Rfl:     tadalafil (Cialis) 5 mg tablet, Take 1 tablet (5 mg) by mouth once daily., Disp: 90 tablet, Rfl: 3    tamsulosin (Flomax) 0.4 mg 24 hr capsule, TAKE 1 CAPSULE BY MOUTH EVERY DAY, Disp: 90 capsule, Rfl: 1    ROS: Neg except HPI            OBJECTIVE            Physical exam  Estimated body mass index is 34.02 kg/m² as calculated from the following:    Height as of 1/30/24: 1.88 m (6' 2\").    Weight as of 2/13/24: 120 kg (265 lb).  Gen/psych: NAD, conversational, appropriate    Ambulation  Gait: antalgic  Assistive device: none  Spine  Lumbar tenderness: neg  Limited ROM: neg, with no radiation or pain with flex/ex, lateral bending  SLR test: neg    Focused MSK exam: R  Hip  Trendelenberg test: neg  Skin/incision: normal   Tenderness: peritrochanteric and proximal thigh  Pain with log roll: neg  Stinchfield: pos  ROM: within expected range but painful  Flexion: 100º  IR: 10º  ER: 50º  Abd: 40º  Neurovascular    Strength: 5/5 hip/knee/ankle flexion and extension  Sensory (L2-S1): SILT throughout lower extremity  Edema/stasis: no pitting edema  Pulse: DP 1+, PT 1+    Imaging  5/9/2024 R hip radiographs (AP Pelvis, AP/Lateral) on my read: Joint space narrowing (moderate, especially on frog lateral) with osteophytes.  AP pelvis also notable for severe L hip osteoarthritis.          ASSESSMENT & PLAN           Patient verbalized understanding of below A&P. All questions answered.  R hip pain  Differential includes radicular pain from spine.  General information: Evaluation, imaging, diagnosis, and treatment " options (conservative and surgical as well as risks, benefits, recovery, and outcomes) discussed. Conservative options should be maximized and include activity modification, bracing, weight loss, PT, home exercise, local pain control (ice, heat, topicals), OTCs, and assistive devices. Once exhausted, injections may provide relief. If these fail to improve pain, function, and quality of life, elective arthroplasty may be an option.  Shared decision making   Discussed with Bernabe that his symptoms could also be originating from his lumbar spine.  Additionally, he has mild tenderness to palpation at the trochanteric region.  Patient elected to be referred to a colleague who performs hip corticosteroid injections.  Discussed both the therapeutic and diagnostic nature of this injection, and he will pay close attention to his symptoms in the hours following the injection.  L hip pain  AP pelvis radiograph incidentally shows severe L hip osteoarthritis.  He is asymptomatic on this side, so we will simply observe.  Verified with radiology that radiograph was AP, not PA  PMH, PSH, other considerations discussed  BMI<40 but on spectrum of increased risk of surgical complications.  SDD: No absolute criteria exist, but pt meets general guidelines (BMI<40, no more than cane preop, no recent falls, home help, no cardopulm dz requiring postop monitoring, no untreated LINDA, no hx anesthesia issues, <=76yo)  Bladder ca in remission  Listed in EMR but not endorsed by pt or evident on exam today: impaired glucose tolerance (no A1c in system)  Follow-up: As needed

## 2024-05-13 ENCOUNTER — LAB (OUTPATIENT)
Dept: LAB | Facility: LAB | Age: 74
End: 2024-05-13
Payer: MEDICARE

## 2024-05-13 DIAGNOSIS — R31.0 GROSS HEMATURIA: ICD-10-CM

## 2024-05-13 LAB
ANION GAP SERPL CALC-SCNC: 13 MMOL/L (ref 10–20)
BUN SERPL-MCNC: 19 MG/DL (ref 6–23)
CALCIUM SERPL-MCNC: 9.3 MG/DL (ref 8.6–10.3)
CHLORIDE SERPL-SCNC: 105 MMOL/L (ref 98–107)
CO2 SERPL-SCNC: 25 MMOL/L (ref 21–32)
CREAT SERPL-MCNC: 0.96 MG/DL (ref 0.5–1.3)
EGFRCR SERPLBLD CKD-EPI 2021: 83 ML/MIN/1.73M*2
GLUCOSE SERPL-MCNC: 112 MG/DL (ref 74–99)
POTASSIUM SERPL-SCNC: 4.4 MMOL/L (ref 3.5–5.3)
SODIUM SERPL-SCNC: 139 MMOL/L (ref 136–145)

## 2024-05-13 PROCEDURE — 87086 URINE CULTURE/COLONY COUNT: CPT

## 2024-05-13 PROCEDURE — 36415 COLL VENOUS BLD VENIPUNCTURE: CPT

## 2024-05-13 PROCEDURE — 80048 BASIC METABOLIC PNL TOTAL CA: CPT

## 2024-05-14 LAB — BACTERIA UR CULT: NO GROWTH

## 2024-05-16 ENCOUNTER — HOSPITAL ENCOUNTER (OUTPATIENT)
Dept: RADIOLOGY | Facility: HOSPITAL | Age: 74
Discharge: HOME | End: 2024-05-16
Payer: MEDICARE

## 2024-05-16 DIAGNOSIS — R31.0 GROSS HEMATURIA: ICD-10-CM

## 2024-05-16 PROCEDURE — 2550000001 HC RX 255 CONTRASTS: Performed by: UROLOGY

## 2024-05-16 PROCEDURE — 76377 3D RENDER W/INTRP POSTPROCES: CPT | Performed by: RADIOLOGY

## 2024-05-16 PROCEDURE — 76377 3D RENDER W/INTRP POSTPROCES: CPT

## 2024-05-16 PROCEDURE — 74178 CT ABD&PLV WO CNTR FLWD CNTR: CPT | Performed by: RADIOLOGY

## 2024-05-16 RX ADMIN — IOHEXOL 75 ML: 350 INJECTION, SOLUTION INTRAVENOUS at 09:37

## 2024-05-28 ENCOUNTER — LAB (OUTPATIENT)
Dept: LAB | Facility: LAB | Age: 74
End: 2024-05-28
Payer: MEDICARE

## 2024-05-28 DIAGNOSIS — R39.9 LOWER URINARY TRACT SYMPTOMS (LUTS): ICD-10-CM

## 2024-05-28 LAB
APPEARANCE UR: ABNORMAL
BILIRUB UR STRIP.AUTO-MCNC: NEGATIVE MG/DL
COLOR UR: YELLOW
GLUCOSE UR STRIP.AUTO-MCNC: NORMAL MG/DL
KETONES UR STRIP.AUTO-MCNC: NEGATIVE MG/DL
LEUKOCYTE ESTERASE UR QL STRIP.AUTO: NEGATIVE
MUCOUS THREADS #/AREA URNS AUTO: ABNORMAL /LPF
NITRITE UR QL STRIP.AUTO: NEGATIVE
PH UR STRIP.AUTO: 5 [PH]
PROT UR STRIP.AUTO-MCNC: ABNORMAL MG/DL
RBC # UR STRIP.AUTO: ABNORMAL /UL
RBC #/AREA URNS AUTO: ABNORMAL /HPF
SP GR UR STRIP.AUTO: 1.02
UROBILINOGEN UR STRIP.AUTO-MCNC: NORMAL MG/DL
WBC #/AREA URNS AUTO: ABNORMAL /HPF

## 2024-05-28 PROCEDURE — 81001 URINALYSIS AUTO W/SCOPE: CPT

## 2024-05-28 PROCEDURE — 87086 URINE CULTURE/COLONY COUNT: CPT

## 2024-05-29 LAB — BACTERIA UR CULT: NO GROWTH

## 2024-05-29 NOTE — PROGRESS NOTES
FUV    Last seen - 2/15/24     HISTORY OF PRESENT ILLNESS:   Bernabe Fletcher is a 74 y.o. male who is being seen today for continued cystoscopic surveillance.    PAST MEDICAL HISTORY:  Past Medical History:   Diagnosis Date    Essential (primary) hypertension 07/05/2021    Benign essential hypertension   - BPH with 2 prior negative prostate biopsies (most recent 11/27/19)  - Non-invasive papillary urothelial carcinoma, high-grade, diagnosed 11/2018  - S/P completion of BCG induction, and maintenance 10/2019, 06/2020, and 01/22/2021, and bladder biopsy on 06/22/2021.     PAST SURGICAL HISTORY:  Past Surgical History:   Procedure Laterality Date    APPENDECTOMY  07/06/2017    Appendectomy    APPENDECTOMY  10/13/2014    Appendectomy    COLONOSCOPY  10/13/2014    Complete Colonoscopy        ALLERGIES:   No Known Allergies     MEDICATIONS:   Current Outpatient Medications   Medication Instructions    acetaZOLAMIDE (Diamox) 250 mg tablet 1 tablet, oral, 2 times daily    atorvastatin (LIPITOR) 20 mg, oral, Daily    diclofenac (Voltaren) 0.1 % ophthalmic solution 1 drop, Right Eye, 3 times daily, For 3 days before surgery, and 1 week after surgery    lifitegrast (Xiidra) 5 % dropperette ophthalmic (eye)    losartan (COZAAR) 50 mg, oral, Daily    moxifloxacin (Vigamox) 0.5 % ophthalmic solution 1 drop, Right Eye, 3 times daily, Before surgery and 3 weeks after surgery    oxyCODONE (ROXICODONE) 5 mg, oral, Every 6 hours PRN    prednisoLONE acetate (Pred-Forte) 1 % ophthalmic suspension 1 drop, Left Eye, 3 times daily, 3 weeks after surgery    tadalafil (CIALIS) 5 mg, oral, Daily    tamsulosin (FLOMAX) 0.4 mg, oral, Daily        PHYSICAL EXAM:  There were no vitals taken for this visit.  Constitutional: Patient appears well-developed and well-nourished. No distress.    Pulmonary/Chest: Effort normal. No respiratory distress.   Abdominal: Soft, ND NT  :   Musculoskeletal: Normal range of motion.    Neurological: Alert and  "oriented to person, place, and time.  Psychiatric: Normal mood and affect. Behavior is normal. Thought content normal.      Labs  No results found for: \"TESTOSTERONE\"  Lab Results   Component Value Date    PSA 4.92 (H) 09/22/2023   PSA panel on 2/20/23 was 5.1 with 27% free. PSA on 06/03/2022 was 4.89 and on 05/26/2020 was 4.3 and 5.49 on 08/08/19.     No components found for: \"CBC\"  Lab Results   Component Value Date    CREATININE 0.96 05/13/2024     No components found for: \"TESTOTMS\"  No results found for: \"TESTF\"    Imaging:   - Renal CT 9/26/23 demonstrating lower pole cyst left kidney with additional probable parapelvic cyst. No solid enhancing renal mass identified. Atherosclerosis. Mild stenosis of the proximal SMA.  - Surgical pathology on 1/30/23 - Negative    Discussion:   Doing well, no complaints. Reports he experiences hematuria and mild dysuria but this has now resolved. Micro urine on 5/28/24 resulted as 6-10 RBCs but UC on 5/28/24 resulted negative. The cystoscopy was completed today due to hematuria and non-invasive papillary urothelial carcinoma, high-grade and demonstrated a large prostate with large medium lobe, hematuria is likely from prostate. There were no tumors, stones or lesions. Cytology sent. 1 abx given to patient in clinic today. Recommend for the patient to follow up with Dr. Greene for evaluation for HoLEP. Patient instructed to follow up in 1 year for continued surveillance.    Cysto schedule: Now on yearly surveillance due to no recurrences in 5 years.     Assessment:      No diagnosis found.  Non-invasive papillary urothelial carcinoma, high-grade  BPH  Bernabe Fletcher is a 74 y.o. male here for FUV     Plan:   Follow up in 1 year for continued cystoscopic surveillance.  Will send referral to Dr. Greene concerning sxs and treatment options, possible HoLEP.   All questions and concerns were addressed. Patient verbalizes understanding and has no other questions at this time.    Scribe " Attestation  By signing my name below, I, Marie Farrelliboumou   attest that this documentation has been prepared under the direction and in the presence of Kuldeep Baer MD.

## 2024-05-30 ENCOUNTER — PROCEDURE VISIT (OUTPATIENT)
Dept: UROLOGY | Facility: HOSPITAL | Age: 74
End: 2024-05-30
Payer: MEDICARE

## 2024-05-30 ENCOUNTER — APPOINTMENT (OUTPATIENT)
Dept: ORTHOPEDIC SURGERY | Facility: CLINIC | Age: 74
End: 2024-05-30
Payer: MEDICARE

## 2024-05-30 DIAGNOSIS — C67.9 MALIGNANT NEOPLASM OF URINARY BLADDER, UNSPECIFIED SITE (MULTI): Primary | ICD-10-CM

## 2024-05-30 DIAGNOSIS — R31.0 GROSS HEMATURIA: ICD-10-CM

## 2024-05-30 PROCEDURE — 52000 CYSTOURETHROSCOPY: CPT | Performed by: UROLOGY

## 2024-05-30 PROCEDURE — 88112 CYTOPATH CELL ENHANCE TECH: CPT | Mod: TC,MCY | Performed by: UROLOGY

## 2024-06-03 ENCOUNTER — OFFICE VISIT (OUTPATIENT)
Dept: ORTHOPEDIC SURGERY | Facility: CLINIC | Age: 74
End: 2024-06-03
Payer: MEDICARE

## 2024-06-03 ENCOUNTER — HOSPITAL ENCOUNTER (OUTPATIENT)
Dept: RADIOLOGY | Facility: EXTERNAL LOCATION | Age: 74
Discharge: HOME | End: 2024-06-03

## 2024-06-03 DIAGNOSIS — M16.11 PRIMARY OSTEOARTHRITIS OF RIGHT HIP: Primary | ICD-10-CM

## 2024-06-03 DIAGNOSIS — M25.551 RIGHT HIP PAIN: ICD-10-CM

## 2024-06-03 PROCEDURE — 1157F ADVNC CARE PLAN IN RCRD: CPT | Performed by: STUDENT IN AN ORGANIZED HEALTH CARE EDUCATION/TRAINING PROGRAM

## 2024-06-03 PROCEDURE — 99213 OFFICE O/P EST LOW 20 MIN: CPT | Performed by: STUDENT IN AN ORGANIZED HEALTH CARE EDUCATION/TRAINING PROGRAM

## 2024-06-03 NOTE — PROGRESS NOTES
REFERRAL SOURCE: Dr. Jones - note from 5/9/24 was reviewed    CHIEF COMPLAINT: right hip pain    HISTORY OF PRESENT ILLNESS  Bernabe Fletcher is a very pleasant 74 y.o. male with history of hypertension, bladder cancer who is here for evaluation of right hip pain.     6/3/24: He complains of right hip pain that started at the end of April after they were on vacation to [place] and he had a long trip home.  He reports that his pain was aggravated in the car.  Pain is located in his C-spine over the right hip with some radiation to the groin.  He gets radiation down the lateral thigh to the knee, but nothing going past the knee.  Denies numbness and tingling.  He did have similar pain in the left hip and he saw Dr. Bhatt about 4 years ago and was given a referral to physical therapy, which helped.  Pain is achy and bothersome at night (particularly when lying on his right side), but it improves with Tylenol PM.  He saw Dr. Jones on 5/19/2024 and his pain was a 7/10.  Today, his pain is a 2-3/10 and is much more manageable.  Since his visit with Dr. Jones he is also been able to play pickle ball.  He is not sure if his pain is severe enough to warrant an injection.    MEDS    Current Outpatient Medications:     acetaZOLAMIDE (Diamox) 250 mg tablet, Take 1 tablet (250 mg) by mouth in the morning and at bedtime., Disp: , Rfl:     atorvastatin (Lipitor) 20 mg tablet, Take 1 tablet (20 mg) by mouth once daily., Disp: 90 tablet, Rfl: 2    diclofenac (Voltaren) 0.1 % ophthalmic solution, Administer 1 drop into the right eye in the morning, at noon, and at bedtime. For 3 days before surgery, and 1 week after surgery, Disp: , Rfl:     lifitegrast (Xiidra) 5 % dropperette, Administer into affected eye(s)., Disp: , Rfl:     losartan (Cozaar) 50 mg tablet, TAKE 1 TABLET BY MOUTH EVERY DAY, Disp: 90 tablet, Rfl: 1    moxifloxacin (Vigamox) 0.5 % ophthalmic solution, Administer 1 drop into the right eye in the morning,  at noon, and at bedtime. Before surgery and 3 weeks after surgery, Disp: , Rfl:     oxyCODONE (Roxicodone) 5 mg immediate release tablet, Take 1 tablet (5 mg) by mouth every 6 hours if needed for severe pain (7 - 10) for up to 4 doses., Disp: 4 tablet, Rfl: 0    prednisoLONE acetate (Pred-Forte) 1 % ophthalmic suspension, Administer 1 drop into the left eye in the morning, at noon, and at bedtime. 3 weeks after surgery, Disp: , Rfl:     tadalafil (Cialis) 5 mg tablet, Take 1 tablet (5 mg) by mouth once daily., Disp: 90 tablet, Rfl: 3    tamsulosin (Flomax) 0.4 mg 24 hr capsule, TAKE 1 CAPSULE BY MOUTH EVERY DAY, Disp: 90 capsule, Rfl: 1    ALLERGIES  No Known Allergies    PAST MEDICAL HISTORY  Past Medical History:   Diagnosis Date    Essential (primary) hypertension 07/05/2021    Benign essential hypertension       PAST SURGICAL HISTORY  Past Surgical History:   Procedure Laterality Date    APPENDECTOMY  07/06/2017    Appendectomy    APPENDECTOMY  10/13/2014    Appendectomy    COLONOSCOPY  10/13/2014    Complete Colonoscopy       SOCIAL HISTORY   Social History     Socioeconomic History    Marital status:      Spouse name: Not on file    Number of children: Not on file    Years of education: Not on file    Highest education level: Not on file   Occupational History    Not on file   Tobacco Use    Smoking status: Never    Smokeless tobacco: Never   Substance and Sexual Activity    Alcohol use: Not Currently    Drug use: Never    Sexual activity: Not on file   Other Topics Concern    Not on file   Social History Narrative    Not on file     Social Determinants of Health     Financial Resource Strain: Patient Declined (1/30/2024)    Overall Financial Resource Strain (CARDIA)     Difficulty of Paying Living Expenses: Patient declined   Food Insecurity: Not on file   Transportation Needs: Patient Declined (1/30/2024)    PRAPARE - Transportation     Lack of Transportation (Medical): Patient declined     Lack of  Transportation (Non-Medical): Patient declined   Physical Activity: Not on file   Stress: Not on file   Social Connections: Not on file   Intimate Partner Violence: Not on file   Housing Stability: Low Risk  (1/31/2024)    Housing Stability Vital Sign     Unable to Pay for Housing in the Last Year: No     Number of Places Lived in the Last Year: 1     Unstable Housing in the Last Year: No       FAMILY HISTORY  Family History   Problem Relation Name Age of Onset    Hypertension Father      Lymphoma Father      Heart failure Father         REVIEW OF SYSTEMS  Except for those mentioned in the history of present illness, and below, a complete 14-point review of systems is negative.     Review of Systems    VITALS  There were no vitals filed for this visit.    PHYSICAL EXAMINATION   GENERAL:  Awake, alert, and oriented, no apparent distress, pleasant, and cooperative  PSYC: Mood is euthymic, affect is congruent  EAR, NOSE, THROAT:  Normocephalic, atraumatic, moist membranes, anicteric sclera  LUNG: Nonlabored breathing  HEART: No clubbing or cyanosis  SKIN: No increased erythema, warmth, rashes, or concerning skin lesions  NEURO: Sensation is intact in the bilateral lower extremities. Strength is grossly 5 out of 5 throughout the bilateral lower extremities, unless noted below.  GAIT: Antalgic.  MSK: Examination of the right hip: Hip range of motion was limited in internal rotation to 15. Scour's and FAIR were positive. Stinchfield was positive. Log roll was positive. Susy's was negative. Ganeslen´s and P4 are negative. No tenderness to palpation over the greater trochanteric region, ASIS, AIIS, adductor tendons, piriformis, ischial tuberosity. Jhonatan's was negative.    IMAGING STUDIES: Radiographs of bilateral hips dated 6/3/2024 were personally reviewed and interpreted by me, Dr. Marjorie Gann, and the findings shared with the patient.  Evidence of mild-moderate right and moderate-severe left hip joint  osteoarthritis.     IMPRESSION  #1  Acute exacerbation of chronic right hip osteoarthritis    PLAN  The following was discussed with the patient:     Bernabe Fletcher is a very pleasant 74 y.o. male with history of hypertension, bladder cancer who is here for evaluation of right hip pain due to acute exacerbation of chronic right hip osteoarthritis.  -The diagnosis of hip arthritis was discussed in detail. The only cure for arthritis is a hip replacement. Without curing arthritis, we can improve the pain that the patient experiences and hopefully allow them to continue to do the activities that they enjoy.  -Physical therapy: Work on hip group and core strengthening and hip flexibility to maintain current ROM with PT. The patient was given a referral to physical therapy today.  -Weight loss and physical activity: The benefits of weight loss and physical activity on improving pain experienced with arthritis were discussed.  -Medications: Continue to take Tylenol over the counter.  He is only been taking Tylenol PM and we discussed that he could take Tylenol in the mornings as well.  -Injections: Injections, such as corticosteroid, can be utilized. The pros, cons, risks, benefits, pre-procedure and post-procedure protocols were discussed.  Given that his pain is significantly improved, he wished to defer steroid injection at this time.  -Follow-up if his pain worsens and he wishes to undergo an injection.    Note sent to: Dr. Jones.    The patient was counseled to remain active, but avoid activities that worsen symptoms. The patient was in agreement with this plan. All questions were answered to the best of my ability.    PATIENT EDUCATION:  Education was discussed at today's appointment. A learning needs assessment was performed.    Primary learner: Bernabe Fletcher  Barriers to learning: None  Preferred language: English  Learning preferences include: Seeing and doing.  Discussed: Diagnosis and treatment  plan.  Demonstrated: Understanding of material discussed.  Patient education materials given: None.  Learner response: Learner demonstrated understanding.    This note was dictated using Dragon speech recognition software and was not corrected for spelling or grammatical errors.

## 2024-06-25 ENCOUNTER — EVALUATION (OUTPATIENT)
Dept: PHYSICAL THERAPY | Facility: CLINIC | Age: 74
End: 2024-06-25
Payer: MEDICARE

## 2024-06-25 DIAGNOSIS — M16.11 PRIMARY OSTEOARTHRITIS OF RIGHT HIP: ICD-10-CM

## 2024-06-25 DIAGNOSIS — M25.651 STIFFNESS OF RIGHT HIP JOINT: ICD-10-CM

## 2024-06-25 DIAGNOSIS — M25.551 PAIN OF RIGHT HIP: Primary | ICD-10-CM

## 2024-06-25 PROCEDURE — 97161 PT EVAL LOW COMPLEX 20 MIN: CPT | Mod: GP

## 2024-06-25 PROCEDURE — 97110 THERAPEUTIC EXERCISES: CPT | Mod: GP

## 2024-06-25 NOTE — PROGRESS NOTES
"  Physical Therapy  Physical Therapy Orthopedic Evaluation    Patient Name: Bernabe Fletcher \"Lu"  MRN: [id number]  Today's Date: 6/25/2024  Time Calculation  Start Time: 1345  Stop Time: 1430  Time Calculation (min): 45 min  PT Evaluation Time Entry  PT Evaluation (Low) Time Entry: 25 PT Therapeutic Procedures Time Entry  Therapeutic Exercise Time Entry: 15          Insurance:  Visit number: 1 of [place]  Authorization info: No auth   Insurance Type: Payor: MEDICARE / Plan: MEDICARE PART A AND B / Product Type: *No Product type* /      Current Problem  Problem List Items Addressed This Visit             ICD-10-CM    Hip osteoarthritis M16.9    Relevant Orders    Follow Up In Physical Therapy    Pain of right hip - Primary M25.551    Relevant Orders    Follow Up In Physical Therapy    Stiffness of right hip joint M25.651    Relevant Orders    Follow Up In Physical Therapy       General:  General  Reason for Referral: R hip pain  Referred By: Dr. Gann    Precautions:  Precautions  Precautions Comment: HBP, OA, Hx of bladder cancer, hearing loss  Medical History Form: Reviewed (scanned into chart)    Subjective:   ANDREAS: pt reports to evaluation due to R hip pain. Pt met with Dr. Gann about an injection and decided not to follow through with it due to the way he has been feeling. Pt first started to notice his pain on a drive home from [place]. Overall, pt feels like he has been doing a lot better but it not 100% better. He will feel his pain after walking for about 15+ minutes. Pt's pain will sometimes go down to his knee but never further than that. Pt has been having trouble sleeping due to this as well.     Previous Med Management: Rest, imaging    PMH: Nothing related to hip     Aggravating Factors: Walking, at night in bed, laying on his R side, too quick of movements     Relieving Factors: Tylenol     Pain/Symptom Scale:  Current: 0/10  Worst: 6/10  Best: 0/10  Location/Nature: lateral and c sign of R " hip and describes as achy   Meds: Tylenol     PLOF: Prior to April, pt was not having any problems with his hip   ADL: No problems with ADLs   Work:  Retired   Athletics: Pickleball and rowing machine   Recreation/ Hobbies: reading, puzzles, gardening     Goals: Pt would like to decrease his pain and get his hip stronger and more flexible     Language: English      Red Flags: Do you have any of the following? No  Fever/chills, unexplained weight changes, dizziness/fainting, unexplained change in bowel or bladder functions, unexplained malaise or muscle weakness, night pain/sweats, numbness or tingling    Objective:  Palpation/Observation  Slight increase in R hip discomfort when palpating greater trochanter of R hip    Hip MMT  Flexion- R: 4/5 with slight increase in pain L: 4+/5  Extension- R: 4+/5  L: 4+/5  Abduction - R: 4/5 L: 4/5  Adduction- R: 4+/5 L: 4+/5     Hip ROM  Flexion - R: 110 with pain L: 120  Abduction - R: 30 L: 30  ER- R: 30 L: 25  IR- R:  15 with pain L: 20    Outcome Measures:  Other Measures  Lower Extremity Funtional Score (LEFS): 61/80     Treatments:  Access Code: [email or web address]  URL: [email or web address]  Date: 06/25/2024  Prepared by: Kobe Amado    Exercises  - Hooklying Isometric Hip Abduction with Belt  - 1 x daily - 7 x weekly - 2-3 sets - 5 reps - 30 sec on/off hold  - Supine Hip Adduction Isometric with Ball  - 1 x daily - 7 x weekly - 3 sets - 10 reps  - Supine Bridge  - 1 x daily - 7 x weekly - 3 sets - 6-10 reps    EDUCATION: Home exercise program, plan of care, activity modifications, pain management, and injury pathology       Assessment:     Patient is a 74 year old male that presents to physical therapy evaluation today due to complaints of R hip pain. Patient impairments include flexibility deficits of R hip, strength deficits in gluteal musculature, and motor control deficits including lack of pelvic control. Due to these impairments, the patients has the  following functional limitations: pain with walking longer distances, difficulty making quick hip motions, and an overall decrease in functional mobility. Skilled therapy recommended in order to to address their impairments and progress towards the associated functional goals. Prognosis is good secondary to their current presentation and client understanding. The pt demonstrates a good understanding of their current plan of care, rehab expectations, and prognosis.       Plan:     Planned Interventions include: therapeutic exercise, self-care home management, manual therapy, therapeutic activities, gait training, neuromuscular coordination, vasopneumatic, dry needling, aquatic therapy  Frequency: 1 x Week  Duration: 8 Weeks  Goals: Set and discussed today  Active       PT Problem       PT Goals       Start:  06/25/24       1. Pt will increase LEFS with 65/80 or better in order to demo an increase in R hip function  2. Pt will increase R hip ROM by 10 degrees in order to demo an increase in hip functional mobility   3. Pt will demo 4+/5 or all hip/knee MMT in order to demo an increase in LE strength   4. Pt will be able to ascend and descend 10 steps with reciprocal gait pattern and proper knee control in order to demo and increase in functional mobility   5. Pt will demo compliance and independence with HEP   6.  Pt will be able to walk for 30 mins with step through gait pattern and non antalgic gait pattern               Plan of care was developed with input and agreement by the patient      Kobe Amado PT

## 2024-06-26 ENCOUNTER — APPOINTMENT (OUTPATIENT)
Dept: PRIMARY CARE | Facility: CLINIC | Age: 74
End: 2024-06-26
Payer: MEDICARE

## 2024-06-26 VITALS — WEIGHT: 258.5 LBS | BODY MASS INDEX: 33.19 KG/M2 | DIASTOLIC BLOOD PRESSURE: 75 MMHG | SYSTOLIC BLOOD PRESSURE: 131 MMHG

## 2024-06-26 DIAGNOSIS — E78.2 MIXED HYPERLIPIDEMIA: ICD-10-CM

## 2024-06-26 DIAGNOSIS — I10 PRIMARY HYPERTENSION: ICD-10-CM

## 2024-06-26 DIAGNOSIS — M54.50 LOW BACK PAIN WITH RADIATION: ICD-10-CM

## 2024-06-26 DIAGNOSIS — N40.0 BENIGN PROSTATIC HYPERPLASIA, UNSPECIFIED WHETHER LOWER URINARY TRACT SYMPTOMS PRESENT: ICD-10-CM

## 2024-06-26 DIAGNOSIS — Z00.00 HEALTH CARE MAINTENANCE: Primary | ICD-10-CM

## 2024-06-26 DIAGNOSIS — G47.33 OSA (OBSTRUCTIVE SLEEP APNEA): ICD-10-CM

## 2024-06-26 PROCEDURE — 1036F TOBACCO NON-USER: CPT | Performed by: INTERNAL MEDICINE

## 2024-06-26 PROCEDURE — 1160F RVW MEDS BY RX/DR IN RCRD: CPT | Performed by: INTERNAL MEDICINE

## 2024-06-26 PROCEDURE — 1157F ADVNC CARE PLAN IN RCRD: CPT | Performed by: INTERNAL MEDICINE

## 2024-06-26 PROCEDURE — 3075F SYST BP GE 130 - 139MM HG: CPT | Performed by: INTERNAL MEDICINE

## 2024-06-26 PROCEDURE — 1159F MED LIST DOCD IN RCRD: CPT | Performed by: INTERNAL MEDICINE

## 2024-06-26 PROCEDURE — 99214 OFFICE O/P EST MOD 30 MIN: CPT | Performed by: INTERNAL MEDICINE

## 2024-06-26 PROCEDURE — 93000 ELECTROCARDIOGRAM COMPLETE: CPT | Performed by: INTERNAL MEDICINE

## 2024-06-26 PROCEDURE — 3078F DIAST BP <80 MM HG: CPT | Performed by: INTERNAL MEDICINE

## 2024-06-26 PROCEDURE — 1170F FXNL STATUS ASSESSED: CPT | Performed by: INTERNAL MEDICINE

## 2024-06-26 PROCEDURE — G0439 PPPS, SUBSEQ VISIT: HCPCS | Performed by: INTERNAL MEDICINE

## 2024-06-26 NOTE — PROGRESS NOTES
Subjective   Patient ID: Robson Fletcher is a 74 y.o. male who presents for No chief complaint on file..    HPI CPE see updated front sheet no chest pain no shortness of breath has had issues with sleep and sleep apnea has had issues with hematuria had scanning and urology visit and cystoscopy had some low back and hip pain had visit with the orthopedic declined the injection but is going to physical therapy was on and off the statin medicine turned out there was his hip and back after all he has resumed it at this point bowels otherwise normal    Past medical history BPH hypertension hyperlipidemia osteoarthritis    Medications noted and unchanged    Allergies no known drug allergies    Social history no tobacco    Family history noted and unchanged    Prevention some exercise some prior blood work reviewed discussed with colonoscopy    Depression screen not depressed      Review of Systems    Objective   There were no vitals taken for this visit.    Physical Exam vital signs noted alert and oriented x 3 NCAT PERRLA EOMI nares without discharge OP benign TM normal bilateral EAC clear bilateral no AC nodes no JVD or bruit no thyromegaly chest clear to auscultation CV regular rate and rhythm S1-S2 without murmur gallop or rub abdomen soft nontender normal active bowel sounds LS spine normal to straighten curvature nontender spinous process negative straight leg raise extremities no clubbing cyanosis or edema normal distal pulses DTR 2+    Assessment/Plan    impression General medical examination hypertension hyperlipidemia low back pain with radiation and/or hip pain insomnia/LINDA osteoarthritis of left hip greater than right hip film BPH other diagnoses  Plan check EKG advised on heart reviewed prior blood work for chemistry slightly increased glucose previous lipids noted and has resumed the statin may recheck blood work in the future after his potential HoLEP procedure with the second urologist and after his appointment  with the sleep medicine specialist and his CPAP good diet regular exercise increase water consumption  back hygiene stretches heat tylenol PT if needed for back and joints continue with current medication for blood pressure and cholesterol and recheck 3 months based on above TT 60 cc 31    Previous colonoscopy 2018 due in 10 years from then

## 2024-07-08 ENCOUNTER — APPOINTMENT (OUTPATIENT)
Dept: SLEEP MEDICINE | Facility: CLINIC | Age: 74
End: 2024-07-08
Payer: MEDICARE

## 2024-07-08 VITALS
OXYGEN SATURATION: 97 % | BODY MASS INDEX: 33.62 KG/M2 | HEIGHT: 74 IN | DIASTOLIC BLOOD PRESSURE: 80 MMHG | HEART RATE: 74 BPM | WEIGHT: 262 LBS | SYSTOLIC BLOOD PRESSURE: 147 MMHG | TEMPERATURE: 98 F

## 2024-07-08 DIAGNOSIS — G47.33 OSA (OBSTRUCTIVE SLEEP APNEA): Primary | ICD-10-CM

## 2024-07-08 DIAGNOSIS — G47.30 SLEEP APNEA, UNSPECIFIED TYPE: ICD-10-CM

## 2024-07-08 DIAGNOSIS — I10 BENIGN ESSENTIAL HYPERTENSION: ICD-10-CM

## 2024-07-08 PROBLEM — R03.0 BLOOD PRESSURE ELEVATED WITHOUT HISTORY OF HTN: Status: RESOLVED | Noted: 2023-01-30 | Resolved: 2024-07-08

## 2024-07-08 PROCEDURE — 1159F MED LIST DOCD IN RCRD: CPT | Performed by: STUDENT IN AN ORGANIZED HEALTH CARE EDUCATION/TRAINING PROGRAM

## 2024-07-08 PROCEDURE — 3077F SYST BP >= 140 MM HG: CPT | Performed by: STUDENT IN AN ORGANIZED HEALTH CARE EDUCATION/TRAINING PROGRAM

## 2024-07-08 PROCEDURE — 1036F TOBACCO NON-USER: CPT | Performed by: STUDENT IN AN ORGANIZED HEALTH CARE EDUCATION/TRAINING PROGRAM

## 2024-07-08 PROCEDURE — G2211 COMPLEX E/M VISIT ADD ON: HCPCS | Performed by: STUDENT IN AN ORGANIZED HEALTH CARE EDUCATION/TRAINING PROGRAM

## 2024-07-08 PROCEDURE — 1157F ADVNC CARE PLAN IN RCRD: CPT | Performed by: STUDENT IN AN ORGANIZED HEALTH CARE EDUCATION/TRAINING PROGRAM

## 2024-07-08 PROCEDURE — 3008F BODY MASS INDEX DOCD: CPT | Performed by: STUDENT IN AN ORGANIZED HEALTH CARE EDUCATION/TRAINING PROGRAM

## 2024-07-08 PROCEDURE — 1160F RVW MEDS BY RX/DR IN RCRD: CPT | Performed by: STUDENT IN AN ORGANIZED HEALTH CARE EDUCATION/TRAINING PROGRAM

## 2024-07-08 PROCEDURE — 99204 OFFICE O/P NEW MOD 45 MIN: CPT | Performed by: STUDENT IN AN ORGANIZED HEALTH CARE EDUCATION/TRAINING PROGRAM

## 2024-07-08 PROCEDURE — 3079F DIAST BP 80-89 MM HG: CPT | Performed by: STUDENT IN AN ORGANIZED HEALTH CARE EDUCATION/TRAINING PROGRAM

## 2024-07-08 ASSESSMENT — SLEEP AND FATIGUE QUESTIONNAIRES
SLEEP_PROBLEM_INTERFERES_DAILY_ACTIVITIES: SOMEWHAT
HOW LIKELY ARE YOU TO NOD OFF OR FALL ASLEEP WHILE SITTING AND TALKING TO SOMEONE: WOULD NEVER DOZE
HOW LIKELY ARE YOU TO NOD OFF OR FALL ASLEEP WHEN YOU ARE A PASSENGER IN A CAR FOR AN HOUR WITHOUT A BREAK: SLIGHT CHANCE OF DOZING
DIFFICULTY_STAYING_ASLEEP: MODERATE
HOW LIKELY ARE YOU TO NOD OFF OR FALL ASLEEP IN A CAR, WHILE STOPPED FOR A FEW MINUTES IN TRAFFIC: WOULD NEVER DOZE
WORRIED_DISTRESSED_DUE_TO_SLEEP: SOMEWHAT
DIFFICULTY_FALLING_ASLEEP: MILD
SATISFACTION_WITH_CURRENT_SLEEP_PATTERN: VERY SATISFIED
HOW LIKELY ARE YOU TO NOD OFF OR FALL ASLEEP WHILE LYING DOWN TO REST IN THE AFTERNOON WHEN CIRCUMSTANCES PERMIT: HIGH CHANCE OF DOZING
HOW LIKELY ARE YOU TO NOD OFF OR FALL ASLEEP WHILE SITTING AND READING: HIGH CHANCE OF DOZING
HOW LIKELY ARE YOU TO NOD OFF OR FALL ASLEEP WHILE SITTING QUIETLY AFTER LUNCH WITHOUT ALCOHOL: SLIGHT CHANCE OF DOZING
ESS-CHAD TOTAL SCORE: 9
HOW LIKELY ARE YOU TO NOD OFF OR FALL ASLEEP WHILE WATCHING TV: SLIGHT CHANCE OF DOZING
SITING INACTIVE IN A PUBLIC PLACE LIKE A CLASS ROOM OR A MOVIE THEATER: WOULD NEVER DOZE
WAKING_TOO_EARLY: MODERATE
SLEEP_PROBLEM_NOTICEABLE_TO_OTHERS: MUCH

## 2024-07-08 ASSESSMENT — ENCOUNTER SYMPTOMS
CARDIOVASCULAR NEGATIVE: 1
RESPIRATORY NEGATIVE: 1
CONSTITUTIONAL NEGATIVE: 1
NEUROLOGICAL NEGATIVE: 1
PSYCHIATRIC NEGATIVE: 1

## 2024-07-08 NOTE — ASSESSMENT & PLAN NOTE
BMI Readings from Last 1 Encounters:   07/08/24 33.64 kg/m²     - encouraged healthy weight loss via diet and exercise

## 2024-07-08 NOTE — ASSESSMENT & PLAN NOTE
Will get HSAT to assess current disease burden  Current machine (replaced DS1) likely is broken  Will get him a new machine once we have a new HSAT result

## 2024-07-08 NOTE — PROGRESS NOTES
" Patient: Bernabe Fletcher    [id number]  : 1950 -- AGE 74 y.o.    Provider: Lopez Fry MD     Location [facility]   Service Date: 2024              [facility]  New Visit Note      HISTORY OF PRESENT ILLNESS     The patient's referring provider is: Marco A Franklin MD; Marco A Franklin MD    HISTORY OF PRESENT ILLNESS   Bernabe Fletcher \"Lu" is a 74 y.o. male with h/o Hypertension, LINDA, and Obesity who presents to a [facility] for a sleep medicine evaluation with concerns of New Patient Visit (Sleep apnea).     Past Sleep History  Patient has the following sleep-related diagnoses: obstructive sleep apnea. Prior sleep study results: significant for mild LINDA with RDI ~ 12 in     Current History    On today's visit, the patient reports that he has been a CPAP user since .  He uses a nasal mask and has been doing well.  He used to have a colleague's  writing him scripts for supplies and new CPAPs then he had his PCP doing it.  However, he recently has been having trouble with his machine.  He felt like he just has not been sleeping as well as before.  He is waking up more at night and sometimes having trouble falling back to sleep.  His current DS1 is set to 6.5-12.  While examining the machine, I found that the machine was definitely not delivering the pressure as set.  It's set to deliver at 7 cwp at start, but it felt like 3-4 cwp if not less than that.    Sleep schedule:      Weekdays / Work Days Weekends / Days Off   Bedtime 12 am  same as weekdays   Sleep latency 15 min same as weekdays   Wake time 7 am  same as weekdays   Total sleep time  Average/day:  Total sleep time 6-7 hours/day Same as weekdays   Naps Yes, for 7times per week for 30-45min. Naps are refreshing   Nocturnal awakenings Yes, about 2 times a night     Preferred sleeping position: SLEEP POSITION: sidelying    Sleep-related ROS:    Sleep Initiation: " no problems going to sleep  Problems going to sleep associated with: No problems going to sleep    Sleep Maintenance: wakes up about 2 times per night  Problems staying asleep associated with: Problems Staying Asleep: nocturia    Breathing during sleep: no symptoms of snoring or concerns of breathing at night when CPAP is on    RLS screen:  RLSSCREEN: - Sensations: Patient does not have unusual sensations in their extremities that cause an urge to move them     Daytime Symptoms  On awakening patient reports: no morning symptoms    Patient reports DAYTIME SYMPTOMS: excessively sleepy during the day  Fatigue: denies feeling fatigue    ESS: 9  JANIS: 12  FOSQ: 32      REVIEW OF SYSTEMS     REVIEW OF SYSTEMS  Review of Systems   Constitutional: Negative.    HENT: Negative.     Respiratory: Negative.     Cardiovascular: Negative.    Genitourinary: Negative.    Skin: Negative.    Neurological: Negative.    Psychiatric/Behavioral: Negative.       SLEEP ROS: snoring      ALLERGIES AND MEDICATIONS     ALLERGIES  No Known Allergies    MEDICATIONS  Current Outpatient Medications   Medication Sig Dispense Refill    atorvastatin (Lipitor) 20 mg tablet Take 1 tablet (20 mg) by mouth once daily. 90 tablet 2    losartan (Cozaar) 50 mg tablet TAKE 1 TABLET BY MOUTH EVERY DAY 90 tablet 1    tadalafil (Cialis) 5 mg tablet Take 1 tablet (5 mg) by mouth once daily. 90 tablet 3    tamsulosin (Flomax) 0.4 mg 24 hr capsule TAKE 1 CAPSULE BY MOUTH EVERY DAY 90 capsule 1     No current facility-administered medications for this visit.         PAST HISTORY     PAST MEDICAL HISTORY  He  has a past medical history of Essential (primary) hypertension (07/05/2021).    PAST SURGICAL HISTORY:  Past Surgical History:   Procedure Laterality Date    APPENDECTOMY  07/06/2017    Appendectomy    APPENDECTOMY  10/13/2014    Appendectomy    COLONOSCOPY  10/13/2014    Complete Colonoscopy       FAMILY HISTORY  Family History   Problem Relation Name Age of  "Onset    Hypertension Father      Lymphoma Father      Heart failure Father         He does not have a family history of sleep disorder.    SOCIAL HISTORY  He  reports that he has never smoked. He has never used smokeless tobacco. He reports that he does not currently use alcohol. He reports that he does not use drugs. He currently lives with his wife.     Caffeine consumption: Yes, 2 cups/day  Alcohol consumption: Yes, rarely (occasional)  Smoking: No  Marijuana: No      PHYSICAL EXAM     VITAL SIGNS: /80 (BP Location: Right arm, Patient Position: Sitting, BP Cuff Size: Adult)   Pulse 74   Temp 36.7 °C (98 °F) (Temporal)   Ht 1.88 m (6' 2\")   Wt 119 kg (262 lb)   SpO2 97%   BMI 33.64 kg/m²      CURRENT WEIGHT:   Vitals:    07/08/24 1505   Weight: 119 kg (262 lb)     Body mass index is 33.64 kg/m².  PREVIOUS WEIGHTS:  Wt Readings from Last 3 Encounters:   07/08/24 119 kg (262 lb)   06/26/24 117 kg (258 lb 8 oz)   02/13/24 120 kg (265 lb)       Physical Exam  Vitals reviewed.   Constitutional:       General: He is not in acute distress.     Appearance: Normal appearance. He is well-developed and normal weight.   HENT:      Head: Normocephalic and atraumatic.      Nose: Nose normal. No congestion or rhinorrhea.      Mouth/Throat:      Mouth: Mucous membranes are moist.      Pharynx: Oropharynx is clear. No oropharyngeal exudate.   Eyes:      General: No scleral icterus.     Extraocular Movements: Extraocular movements intact.      Conjunctiva/sclera: Conjunctivae normal.      Pupils: Pupils are equal, round, and reactive to light.   Neck:      Thyroid: No thyroid mass or thyroid tenderness.      Vascular: No JVD.   Cardiovascular:      Rate and Rhythm: Normal rate.      Pulses: Normal pulses.   Pulmonary:      Effort: Pulmonary effort is normal. No respiratory distress.   Musculoskeletal:      Cervical back: Normal range of motion and neck supple. No rigidity or tenderness.   Lymphadenopathy:      " Cervical: No cervical adenopathy.   Neurological:      Mental Status: He is alert and oriented to person, place, and time.   Psychiatric:         Mood and Affect: Mood normal.         Behavior: Behavior normal.         Thought Content: Thought content normal.       PHYSICAL EXAM: MODIFIED MALLAMPATI SCORE: III (soft and hard palate and base of uvula visible)  TONGUE SCALLOPING: with scalloping      RESULTS/DATA     Bicarbonate (mmol/L)   Date Value   05/13/2024 25   01/31/2024 22   01/22/2024 28             ASSESSMENT/PLAN     Mr. Fletcher is a 74 y.o. male and He was referred to the [facility] for evaluation of LINDA    Problem List, Orders, Assessment, Recommendations:  Problem List Items Addressed This Visit             ICD-10-CM    Benign essential hypertension I10     BP Readings from Last 1 Encounters:   07/08/24 147/80     - BP mildly elevated today but asymptomatic  - discussed at length the impact of untreated LINDA and BP control  - continue current management and follow-up with PCP          LINDA (obstructive sleep apnea) - Primary G47.33     Will get HSAT to assess current disease burden  Current machine (replaced DS1) likely is broken  Will get him a new machine once we have a new HSAT result         BMI 33.0-33.9,adult Z68.33     BMI Readings from Last 1 Encounters:   07/08/24 33.64 kg/m²     - encouraged healthy weight loss via diet and exercise              Disposition    Return to clinic in 4 months

## 2024-07-08 NOTE — PATIENT INSTRUCTIONS
[facility]  [address]  [facility]  [address] [address]       NAME: Bernabe Fletcher   DATE: 07/08/24    Your Sleep Provider Today: Lopez Fry MD  Your Primary Care Physician: Marco A Franklin MD   Your Referring Provider: Marco A Franklin MD    DIAGNOSIS:   1. Sleep apnea, unspecified type  Referral to Adult Sleep Medicine    Home sleep apnea test (HSAT)          Thank you for coming to the Sleep Medicine Clinic today! Your sleep medicine provider today was: Lopez Fry MD Below is a summary of your treatment plan, other important information, and our contact numbers:      TREATMENT PLAN     - Get your sleep study scheduled  - Follow-up in 4 months.  - If not already done, sign up for 'My Chart' and send prescription requests or messages through this      Scheduling a Sleep Study    Call the  Sleep Testing Center to speak with a sleep testing  to book your overnight sleep study procedure at one of our adult and pediatric-friendly sleep labs. Overnight sleep studies may be scheduled on a weekday or weekend.    We have child life services on a case by case basis at the [facility] location. We also perform daytime testing for shift workers on a case by case basis.    Locations for sleep studies are: [facility], [facility]), [facility], [facility], [facility], [place], [place].    Bring your usual medications and nightly routine items for your sleep study. In order to fall asleep faster in sleep lab, we advise patients to wake up earlier on the morning of the scheduled testing and avoid napping prior to testing. Sometimes, your provider may prescribe a sleep aid to be taken at lights out in the sleep lab. If you are taking a sleep aid, please have somebody pick you up after the sleep testing.    Results of your sleep study will be given to the ordering clinician. Please contact their office for results  or follow up as directed by your clinician.    For additional information about the sleep medicine services, please call [phone]               IMPORTANT INFORMATION     Call 911 for medical emergencies.  Our offices are generally open from Monday-Friday, 9 am - 5 pm.  If you need to get in touch with me, you may either call me and my team(number is below) or you can use [employer].  If a referral for a test, for CPAP, or for another specialist was made, and you have not heard about scheduling this within a week, please call scheduling at [phone]).  If you are unable to make your appointment for clinic or an overnight study, kindly call the office at least 48 hours in advance to cancel and reschedule.  If you are on CPAP, please bring your device's card or the device to each clinic appointment.   There are no supporting services by either the sleep doctors or their staff on weekends and Holidays, or after 5 PM on weekdays.   If you have been asked to come to a sleep study, make sure you bring toiletries, a comfy pillow, and any nighttime medications that you may regularly take. Also be sure to eat dinner before you arrive. We generally do not provide meals.      PRESCRIPTIONS     We require 7 days advanced notice for prescription refills. If we do not receive the request in this time, we cannot guarantee that your medication will be refilled in time.      IMPORTANT PHONE NUMBERS     Sleep Medicine Clinic Fax: [phone]  Appointments (for Adult Sleep Clinic): [phone] - option 2  Appointments (For Sleep Studies): [phone] - option 3  Behavioral Sleep Medicine: [phone]  Sleep Surgery: [phone]  ENT (Otolaryngology): [phone]  Headache Clinic (Neurology): [phone]  Neurology: [phone]  Psychiatry: [phone]  Pulmonary Function Testing (PFT) Center: [phone]  Pulmonary Medicine: [phone]  [employer] ([employer]): [phone]  [facility]  [employer] (DME): [phone]  [facility] (DME): [phone]      OUR ADULT SLEEP MEDICINE TEAM   Please do not hesitate to call the office or sleep nurse with any questions between appointments:    Adult Sleep Nurses (Monica Escobedo, RN and Abby Alonzo, RN):  For clinical questions and refilling prescriptions: [phone]  Email sleep diaries and other documents at: [email or web address]    Adult Sleep Medicine Secretaries:  Catia Nicholson (For Yoshi/Leyva/Krise/Strohl/Yeh/Oliver):   P: [phone]  F: [phone]  Sofie Lowe (For Gavin/Crow): P: [phone]  Fax: [phone]  Ramonita Skinner (For Jurcevic/Blank): P: [phone]  F: [phone]  Elisha Pineda (For Seltzer): P: [phone]  F: [phone]  Rosa M Portillo (For Susanna/Linda/Zakhary): P: [phone]  F: [phone]  Asha Us (For Abdirashid/Jaswinder): P: [phone]  F: [phone]     Adult Sleep Medicine Advanced Practice Providers:  Jean Mercado ([place], [place])  Rbuy Mckeon ([facility])  Aida Maria CNP ([place], [place], [place])  Alexa Veliz CNP ([place], [place], [place])  Britni Lewis ([place], [place], [place])  Hamzah San CNP ([facility])        OUR SLEEP TESTING LOCATIONS     Our team will contact you to schedule your sleep study, however, you can contact us as follow:  Main Phone Line (scheduling only): [phone]), option 3  Adult and Pediatric Locations  [facility] (6 years and older): [facility] - 4th floor ([address] [place] [address]) After hours line: [phone]  [facility] (Main campus: All ages): [facility], 6th floor. After hours line: [phone]   [place] (5 years and older; younger considered on case-by-case basis): [address]; [address], [address]. Scheduling  After hours line: [phone]   [place] (6 years and older): [address];  "[address]; [address]   [place] (6 years and older): [address] [facility], [address]  After hours line: [phone]   [place] (13 years and older) in [place]: [address], 2nd floor  After hours line: [phone]   [place] (13 year and older): [address]  After hours line: [phone]     Adult Only Locations:   [place] (18 years and older): [address] [place] [address], 2nd floor   [place] (18 years and older): [address] [facility]; 4th floor  After hours line: [phone]   [place] (18 years and older) at [place]: [address] [place] [address]  After hours line: [phone]          CONTACTING YOUR SLEEP MEDICINE PROVIDER     Send a message directly to your provider through \"My Chart\", which is the email service through your  Records Account: [email or web address]   Call [phone] and leave a message. One of the administrative assistants will forward the message to your sleep medicine provider through \"My Chart\" and/or email.     Your sleep medicine provider for this visit was: Lopez Fry MD    "

## 2024-07-08 NOTE — ASSESSMENT & PLAN NOTE
BP Readings from Last 1 Encounters:   07/08/24 147/80     - BP mildly elevated today but asymptomatic  - discussed at length the impact of untreated LINDA and BP control  - continue current management and follow-up with PCP

## 2024-07-10 ENCOUNTER — PROCEDURE VISIT (OUTPATIENT)
Dept: SLEEP MEDICINE | Facility: CLINIC | Age: 74
End: 2024-07-10
Payer: MEDICARE

## 2024-07-10 ENCOUNTER — TREATMENT (OUTPATIENT)
Dept: PHYSICAL THERAPY | Facility: CLINIC | Age: 74
End: 2024-07-10
Payer: MEDICARE

## 2024-07-10 DIAGNOSIS — G47.33 OBSTRUCTIVE SLEEP APNEA (ADULT) (PEDIATRIC): ICD-10-CM

## 2024-07-10 DIAGNOSIS — M25.651 STIFFNESS OF RIGHT HIP JOINT: Primary | ICD-10-CM

## 2024-07-10 DIAGNOSIS — M25.551 PAIN OF RIGHT HIP: ICD-10-CM

## 2024-07-10 DIAGNOSIS — G47.30 SLEEP APNEA, UNSPECIFIED TYPE: ICD-10-CM

## 2024-07-10 PROCEDURE — 97110 THERAPEUTIC EXERCISES: CPT | Mod: GP

## 2024-07-10 PROCEDURE — 95806 SLEEP STUDY UNATT&RESP EFFT: CPT | Performed by: STUDENT IN AN ORGANIZED HEALTH CARE EDUCATION/TRAINING PROGRAM

## 2024-07-10 PROCEDURE — 97140 MANUAL THERAPY 1/> REGIONS: CPT | Mod: GP

## 2024-07-10 NOTE — PROGRESS NOTES
"Physical Therapy  Physical Therapy Treatment Note    Patient Name: Bernabe Fletcher  MRN: [id number]  Today's Date: 7/10/2024  Time Calculation  Start Time: 1200  Stop Time: 1245  Time Calculation (min): 45 min  PT Therapeutic Procedures Time Entry  Manual Therapy Time Entry: 10  Therapeutic Exercise Time Entry: 30        Insurance:  Visit number: 2 of MN  Authorization info: No auth   Insurance Type: Payor: MEDICARE / Plan: MEDICARE PART A AND B / Product Type: *No Product type* /   Current Problem  1. Stiffness of right hip joint        2. Pain of right hip          General  Reason for Referral: R hip pain  Referred By: Dr. Gann  Precautions  Precautions  Precautions Comment: HBP, OA, Hx of bladder cancer, hearing loss  Subjective:     Patient reports that his hip has been feeling pretty good overall. He does not have much pain during the day but will sometimes have pain at night.   Pain     Objective:   Palpation/Observation  Slight increase in R hip discomfort when palpating greater trochanter of R hip     Hip MMT  Flexion- R: 4/5 with slight increase in pain L: 4+/5  Extension- R: 4+/5  L: 4+/5  Abduction - R: 4/5 L: 4/5  Adduction- R: 4+/5 L: 4+/5      Hip ROM  Flexion - R: 110 with pain L: 120  Abduction - R: 30 L: 30  ER- R: 30 L: 25  IR- R:  15 with pain L: 20     Outcome Measures:  Other Measures  Lower Extremity Funtional Score (LEFS): 61/80   Treatments:     THERE EX  Banded clamshell 3 x 10 (blue)   Banded glute bridge 2 x 10   Supine adduction squeeze 2 x 10   6\" step up 2 x 10 each side d    MANUAL  Belted lateral mobs   Long axis distraction     Assessment:  pt tolerated session well. Pt was able to continue to progress gluteal strengthening as well as hip ROM with belted hip mobilization. Pt was able to begin functional strengthening with step up exercise and no increase in pain. Pt continues to progress towards goals established in the POC and should continue with skilled therapy.       Plan: " Continue to work on hip strength and ROM      Goals:  Active       PT Problem       PT Goals       Start:  06/25/24       1. Pt will increase LEFS with 65/80 or better in order to demo an increase in R hip function  2. Pt will increase R hip ROM by 10 degrees in order to demo an increase in hip functional mobility   3. Pt will demo 4+/5 or all hip/knee MMT in order to demo an increase in LE strength   4. Pt will be able to ascend and descend 10 steps with reciprocal gait pattern and proper knee control in order to demo and increase in functional mobility   5. Pt will demo compliance and independence with HEP   6.  Pt will be able to walk for 30 mins with step through gait pattern and non antalgic gait pattern                Kobe Amado, PT

## 2024-07-10 NOTE — PROGRESS NOTES
Type of Study: HOME SLEEP STUDY - NOMAD     The patient received equipment and instructions for use of the [facility] [facility] Nomad HSAT device. The patient was instructed how to apply the effort belts, cannula, thermistor. It was also explained how the Nomad and oximeter components work.  The patient was asked to record their sleep for an 8-hour period.     The patient was informed of their responsibility for the device and acknowledged this by signing the HSAT device contract. The patient was asked to return the device on 07/11/2024 between the hours of 08:00 - 15:00  to the Sleep Center.     The patient was instructed to call 911 as usual for any medical- emergencies while at home.  The patient was also given a phone number for troubleshooting when using the device in case there were additional questions.

## 2024-07-14 ASSESSMENT — ACTIVITIES OF DAILY LIVING (ADL)
TAKING_MEDICATION: INDEPENDENT
DOING_HOUSEWORK: INDEPENDENT
BATHING: INDEPENDENT
MANAGING_FINANCES: INDEPENDENT
DRESSING: INDEPENDENT
GROCERY_SHOPPING: INDEPENDENT

## 2024-07-14 ASSESSMENT — ENCOUNTER SYMPTOMS
OCCASIONAL FEELINGS OF UNSTEADINESS: 0
LOSS OF SENSATION IN FEET: 0
DEPRESSION: 0

## 2024-07-16 ENCOUNTER — TELEPHONE (OUTPATIENT)
Dept: SLEEP MEDICINE | Facility: HOSPITAL | Age: 74
End: 2024-07-16
Payer: MEDICARE

## 2024-07-16 DIAGNOSIS — G47.33 OSA (OBSTRUCTIVE SLEEP APNEA): ICD-10-CM

## 2024-07-16 NOTE — TELEPHONE ENCOUNTER
Called patient and left VM regarding the availability of sleep test result.  Sleep nurse phone number [phone] - given to call back for results and plan going forward.

## 2024-07-16 NOTE — TELEPHONE ENCOUNTER
----- Message from Lopez Fry sent at 7/14/2024  4:45 PM EDT -----  Michael Anderson,    Let's start this patient on PAP.  Expedite this order please as his current machine is broken    Thank you      Lopez

## 2024-07-16 NOTE — TELEPHONE ENCOUNTER
Patient returned call regarding the sleep study result. Patient agreeable to start APAP therapy with [facility]. Patient scheduled for a follow-up appointment with Dr. Fry between 30-90 days after CPAP initiation on 11/11/2024. Patient verbalized understanding, all questions answered.

## 2024-07-18 ENCOUNTER — TREATMENT (OUTPATIENT)
Dept: PHYSICAL THERAPY | Facility: CLINIC | Age: 74
End: 2024-07-18
Payer: MEDICARE

## 2024-07-18 DIAGNOSIS — M16.11 PRIMARY OSTEOARTHRITIS OF RIGHT HIP: ICD-10-CM

## 2024-07-18 DIAGNOSIS — M25.651 STIFFNESS OF RIGHT HIP JOINT: ICD-10-CM

## 2024-07-18 DIAGNOSIS — M25.551 PAIN OF RIGHT HIP: ICD-10-CM

## 2024-07-18 PROCEDURE — 97110 THERAPEUTIC EXERCISES: CPT | Mod: GP

## 2024-07-18 NOTE — PROGRESS NOTES
Physical Therapy  Physical Therapy Treatment Note    Patient Name: Bernabe Fletcher  MRN: [id number]  Today's Date: 7/18/2024  Time Calculation  Start Time: 1145  Stop Time: 1230  Time Calculation (min): 45 min  PT Therapeutic Procedures Time Entry  Therapeutic Exercise Time Entry: 41        Insurance:  Visit number: 3 of MN  Authorization info: No auth   Insurance Type: Payor: MEDICARE / Plan: MEDICARE PART A AND B / Product Type: *No Product type* /   Current Problem  1. Primary osteoarthritis of right hip  Follow Up In Physical Therapy      2. Pain of right hip  Follow Up In Physical Therapy      3. Stiffness of right hip joint  Follow Up In Physical Therapy        General  Reason for Referral: R hip pain  Referred By: Dr. Gann  Precautions  Precautions  Precautions Comment: HBP, OA, Hx of bladder cancer, hearing loss  Subjective:     Patient feels like he has been getting stronger since beginning his HEP. Pt's pain on average has been about 1-2/10.   Pain     Objective:   Palpation/Observation  Slight increase in R hip discomfort when palpating greater trochanter of R hip     Hip MMT  Flexion- R: 4/5 with slight increase in pain L: 4+/5  Extension- R: 4+/5  L: 4+/5  Abduction - R: 4/5 L: 4/5  Adduction- R: 4+/5 L: 4+/5      Hip ROM  Flexion - R: 110 with pain L: 120  Abduction - R: 30 L: 30  ER- R: 30 L: 25  IR- R:  15 with pain L: 20     Outcome Measures:  Other Measures  Lower Extremity Funtional Score (LEFS): 61/80   Treatments:     THERE EX  PROM of R hip   Banded clamshell 3 x 10 (blue)   Supine banded flexion 3 x 10 (blue)   Supine adduction squeeze 2 x 10   Seated IR with adduction 2 x 10   BL shuttle press 3 x 10 (25,50#)     MANUAL  Belted lateral mobs   Long axis distraction     Assessment:  pt tolerated session well. Pt had no increase in hip discomfort during entire session. Pt was able to continue to progress strengthening and work on hip flexion and IR. Pt continues to progress towards goals  established in the POC and should continue with skilled therapy.           Plan: Continue to work on hip strength and ROM      Goals:  Active       PT Problem       PT Goals       Start:  06/25/24       1. Pt will increase LEFS with 65/80 or better in order to demo an increase in R hip function  2. Pt will increase R hip ROM by 10 degrees in order to demo an increase in hip functional mobility   3. Pt will demo 4+/5 or all hip/knee MMT in order to demo an increase in LE strength   4. Pt will be able to ascend and descend 10 steps with reciprocal gait pattern and proper knee control in order to demo and increase in functional mobility   5. Pt will demo compliance and independence with HEP   6.  Pt will be able to walk for 30 mins with step through gait pattern and non antalgic gait pattern                Kobe Amado, PT

## 2024-07-24 ENCOUNTER — OFFICE VISIT (OUTPATIENT)
Dept: UROLOGY | Facility: HOSPITAL | Age: 74
End: 2024-07-24
Payer: MEDICARE

## 2024-07-24 DIAGNOSIS — N13.8 BENIGN PROSTATIC HYPERPLASIA WITH URINARY OBSTRUCTION: Primary | ICD-10-CM

## 2024-07-24 DIAGNOSIS — I10 BENIGN ESSENTIAL HYPERTENSION: ICD-10-CM

## 2024-07-24 DIAGNOSIS — N40.1 BENIGN PROSTATIC HYPERPLASIA WITH URINARY OBSTRUCTION: Primary | ICD-10-CM

## 2024-07-24 DIAGNOSIS — R31.0 GROSS HEMATURIA: ICD-10-CM

## 2024-07-24 PROCEDURE — 99204 OFFICE O/P NEW MOD 45 MIN: CPT | Performed by: UROLOGY

## 2024-07-24 PROCEDURE — 51798 US URINE CAPACITY MEASURE: CPT | Performed by: UROLOGY

## 2024-07-24 PROCEDURE — 99214 OFFICE O/P EST MOD 30 MIN: CPT | Performed by: UROLOGY

## 2024-07-24 PROCEDURE — 1157F ADVNC CARE PLAN IN RCRD: CPT | Performed by: UROLOGY

## 2024-07-24 RX ORDER — LOSARTAN POTASSIUM 50 MG/1
50 TABLET ORAL DAILY
Qty: 90 TABLET | Refills: 1 | Status: SHIPPED | OUTPATIENT
Start: 2024-07-24

## 2024-07-24 NOTE — PROGRESS NOTES
HPI    74 y.o. male being seen with the following problem list:    Problem list:  BPH with 2 prior negative prostate biopsies (most recent 11/27/19)  - Non-invasive papillary urothelial carcinoma, high-grade, diagnosed 11/2018  - S/P completion of BCG induction, and maintenance 10/2019, 06/2020, and 01/22/2021, and bladder biopsy on 06/22/2021.     Cyston with Dr. Baer 5/2024 - completed today due to hematuria and non-invasive papillary urothelial carcinoma, high-grade and demonstrated a large prostate with large medium lobe, hematuria is likely from prostate. There were no tumors, stones or lesions     07/24/24 - seen today to discuss outlet procedures. No bothersome LUTS. Only had the one episode of bleeding in early may, lasted for 5d or so, some clots, but nothing severe. Not inclined to have prostate surgery at this time. PVR 0cc    Lab Results   Component Value Date    PSA 4.92 (H) 09/22/2023    PSA 4.33 (H) 06/15/2023    PSA 5.1 (H) 02/20/2023    PSA 4.30 (H) 05/26/2020    PSA 5.49 (H) 08/08/2019         Current Medications:  Current Outpatient Medications   Medication Sig Dispense Refill    atorvastatin (Lipitor) 20 mg tablet Take 1 tablet (20 mg) by mouth once daily. 90 tablet 2    losartan (Cozaar) 50 mg tablet TAKE 1 TABLET BY MOUTH EVERY DAY 90 tablet 1    tadalafil (Cialis) 5 mg tablet Take 1 tablet (5 mg) by mouth once daily. 90 tablet 3    tamsulosin (Flomax) 0.4 mg 24 hr capsule TAKE 1 CAPSULE BY MOUTH EVERY DAY 90 capsule 1     No current facility-administered medications for this visit.        Active Problems:  Robson Fletcher is a 74 y.o. male with the following Problems and Medications.  Patient Active Problem List   Diagnosis    Abnormal urinalysis    Benign essential hypertension    Benign hypertensive heart disease    Bladder cancer (Multi)    BPH with elevated PSA    Choroidal nevus of right eye    Combined form of age-related cataract, both eyes    Mixed hyperlipidemia    Contracture, joint,  hand    Edema    Glucose intolerance (impaired glucose tolerance)    Gross hematuria    Hearing loss    Hip osteoarthritis    Knee osteoarthritis    Pain of right hip    Left knee pain    Right shoulder pain    Sinus bradycardia    LINDA (obstructive sleep apnea)    Urine frequency    Bilateral renal cysts    Pseudophakia of both eyes    Malignant neoplasm of urinary bladder, unspecified site (Multi)    Stiffness of right hip joint    BMI 33.0-33.9,adult     Current Outpatient Medications   Medication Sig Dispense Refill    atorvastatin (Lipitor) 20 mg tablet Take 1 tablet (20 mg) by mouth once daily. 90 tablet 2    losartan (Cozaar) 50 mg tablet TAKE 1 TABLET BY MOUTH EVERY DAY 90 tablet 1    tadalafil (Cialis) 5 mg tablet Take 1 tablet (5 mg) by mouth once daily. 90 tablet 3    tamsulosin (Flomax) 0.4 mg 24 hr capsule TAKE 1 CAPSULE BY MOUTH EVERY DAY 90 capsule 1     No current facility-administered medications for this visit.       PMH:  Past Medical History:   Diagnosis Date    Essential (primary) hypertension 07/05/2021    Benign essential hypertension       PSH:  Past Surgical History:   Procedure Laterality Date    APPENDECTOMY  07/06/2017    Appendectomy    APPENDECTOMY  10/13/2014    Appendectomy    COLONOSCOPY  10/13/2014    Complete Colonoscopy       FMH:  Family History   Problem Relation Name Age of Onset    Hypertension Father      Lymphoma Father      Heart failure Father         SHx:  Social History     Tobacco Use    Smoking status: Never    Smokeless tobacco: Never   Substance Use Topics    Alcohol use: Not Currently    Drug use: Never       Allergies:  No Known Allergies      Assessment/Plan  Gross hematuria, enlarged prostate with significant intravesical protrusion, minimal bothersome urinary symptoms at baseline.    We discussed options for management.  Discussed observation versus medical versus surgical therapy.  Discussed reasons to proceed with with change in management, namely infections,  retention, bleeding, or bothersome urinary symptoms.  The only real reason to  at this time was the week of gross hematuria that he had in May.  However, hematuria was not significant and did not require intervention.  Would be reasonable to continue to monitor, which is where he is leaning.  If things get worse, either with objective problems or for subjective symptoms, would recommend HoLEP.    We discussed surgical options for his prostate and bothersome LUTS. We discussed various options including TURP, greenlight, Rezum, Urolift, HoLEP. We discussed HoLEP as a size-independent procedure that maximally de-obstructs the prostate with relatively less postop healing and recovery as compared to other modalities. We discussed the surgery in detail. Discussed the risks of bleeding, infection, scarring, transient incontinence, rare (<1%) risk of long-term incontinence. Discussed the perioperative pathway. Discussed the near certainty of permanent ejaculatory dysfunction, but likely no change to his baseline erectile function.     For now, he will think about it.  Will see back in 3 months over telehealth for symptom check, sooner if needed.        Scribe Attestation  By signing my name below, I, Sangeeta Qureshi, attest that this documentation  has been prepared under the direction and in the presence of Aron Greene MD.

## 2024-07-30 ENCOUNTER — APPOINTMENT (OUTPATIENT)
Dept: PHYSICAL THERAPY | Facility: CLINIC | Age: 74
End: 2024-07-30
Payer: MEDICARE

## 2024-08-07 ENCOUNTER — OFFICE VISIT (OUTPATIENT)
Dept: OPHTHALMOLOGY | Facility: CLINIC | Age: 74
End: 2024-08-07
Payer: MEDICARE

## 2024-08-07 DIAGNOSIS — H43.811 PVD (POSTERIOR VITREOUS DETACHMENT), RIGHT EYE: Primary | ICD-10-CM

## 2024-08-07 DIAGNOSIS — Z96.1 PSEUDOPHAKIA OF BOTH EYES: ICD-10-CM

## 2024-08-07 PROCEDURE — 99213 OFFICE O/P EST LOW 20 MIN: CPT | Performed by: OPHTHALMOLOGY

## 2024-08-07 ASSESSMENT — CUP TO DISC RATIO
OS_RATIO: .3
OD_RATIO: .3

## 2024-08-07 ASSESSMENT — ENCOUNTER SYMPTOMS
ENDOCRINE NEGATIVE: 0
CONSTITUTIONAL NEGATIVE: 0
HEMATOLOGIC/LYMPHATIC NEGATIVE: 0
RESPIRATORY NEGATIVE: 0
NEUROLOGICAL NEGATIVE: 0
EYES NEGATIVE: 1
CARDIOVASCULAR NEGATIVE: 0
GASTROINTESTINAL NEGATIVE: 0
ALLERGIC/IMMUNOLOGIC NEGATIVE: 0
PSYCHIATRIC NEGATIVE: 0
MUSCULOSKELETAL NEGATIVE: 0

## 2024-08-07 ASSESSMENT — CONF VISUAL FIELD
OD_NORMAL: 1
OD_SUPERIOR_NASAL_RESTRICTION: 0
OD_SUPERIOR_TEMPORAL_RESTRICTION: 0
OS_INFERIOR_TEMPORAL_RESTRICTION: 0
OS_NORMAL: 1
OS_SUPERIOR_TEMPORAL_RESTRICTION: 0
OD_INFERIOR_NASAL_RESTRICTION: 0
OS_SUPERIOR_NASAL_RESTRICTION: 0
OS_INFERIOR_NASAL_RESTRICTION: 0
OD_INFERIOR_TEMPORAL_RESTRICTION: 0

## 2024-08-07 ASSESSMENT — REFRACTION_MANIFEST
OD_CYLINDER: -1.25
OD_AXIS: 080
OS_ADD: +2.50
OD_ADD: +2.50
OS_SPHERE: +1.75
OD_SPHERE: +1.75
OS_AXIS: 105
OS_CYLINDER: -0.75

## 2024-08-07 ASSESSMENT — VISUAL ACUITY
METHOD: SNELLEN - LINEAR
OD_CC+: +1
OD_CC: 20/25
CORRECTION_TYPE: GLASSES
OS_CC: 20/25
OS_CC+: -2

## 2024-08-07 ASSESSMENT — EXTERNAL EXAM - LEFT EYE: OS_EXAM: NORMAL

## 2024-08-07 ASSESSMENT — REFRACTION_WEARINGRX
OS_CYLINDER: -1.00
OD_SPHERE: +2.50
OS_ADD: +2.50
OD_AXIS: 080
OS_SPHERE: +2.00
OD_CYLINDER: -1.25
OD_ADD: +2.50
OS_AXIS: 108

## 2024-08-07 ASSESSMENT — TONOMETRY
IOP_METHOD: GOLDMANN APPLANATION
OS_IOP_MMHG: 10
OD_IOP_MMHG: 12

## 2024-08-07 ASSESSMENT — SLIT LAMP EXAM - LIDS
COMMENTS: GOOD POSITION
COMMENTS: GOOD POSITION

## 2024-08-07 ASSESSMENT — EXTERNAL EXAM - RIGHT EYE: OD_EXAM: NORMAL

## 2024-08-07 NOTE — PROGRESS NOTES
Assessment/Plan   Diagnoses and all orders for this visit:  PVD (posterior vitreous detachment), right eye  pt ed on exam findings, pt ed to rtc with any new or worsening symptoms - floaters, flashes, curtain or veil over vision or missing areas of vision; otherwise monitor at next visit   Pseudophakia of both eyes  Glasses prescription given to patient today

## 2024-08-20 ENCOUNTER — APPOINTMENT (OUTPATIENT)
Dept: PHYSICAL THERAPY | Facility: CLINIC | Age: 74
End: 2024-08-20
Payer: MEDICARE

## 2024-08-20 DIAGNOSIS — M16.11 PRIMARY OSTEOARTHRITIS OF RIGHT HIP: ICD-10-CM

## 2024-08-20 DIAGNOSIS — M25.651 STIFFNESS OF RIGHT HIP JOINT: ICD-10-CM

## 2024-08-20 DIAGNOSIS — M25.551 PAIN OF RIGHT HIP: ICD-10-CM

## 2024-08-20 PROCEDURE — 97140 MANUAL THERAPY 1/> REGIONS: CPT | Mod: GP

## 2024-08-20 PROCEDURE — 97110 THERAPEUTIC EXERCISES: CPT | Mod: GP

## 2024-08-20 NOTE — PROGRESS NOTES
"Physical Therapy  Physical Therapy Treatment Note    Patient Name: Bernabe Fletcher  MRN: [id number]  Today's Date: 8/20/2024  Time Calculation  Start Time: 1200  Stop Time: 1245  Time Calculation (min): 45 min  PT Therapeutic Procedures Time Entry  Manual Therapy Time Entry: 10  Therapeutic Exercise Time Entry: 30        Insurance:  Visit number: 4 of [place]  Authorization info: No auth   Insurance Type: Payor: MEDICARE / Plan: MEDICARE PART A AND B / Product Type: *No Product type* /   Current Problem  1. Primary osteoarthritis of right hip  Follow Up In Physical Therapy      2. Pain of right hip  Follow Up In Physical Therapy      3. Stiffness of right hip joint  Follow Up In Physical Therapy        General  Reason for Referral: R hip pain  Referred By: Dr. Gann  Precautions  Precautions  Precautions Comment: HBP, OA, Hx of bladder cancer, hearing loss  Subjective:     Patient has not had much pain and has been able to continue playing pickleball. Pt feels ready to be discharged today.   Pain     Objective:   Palpation/Observation  Slight increase in R hip discomfort when palpating greater trochanter of R hip     Hip MMT  Flexion- R: 4/5 with slight increase in pain L: 4+/5  Extension- R: 4+/5  L: 4+/5  Abduction - R: 4/5 L: 4/5  Adduction- R: 4+/5 L: 4+/5      Hip ROM  Flexion - R: 110 with pain L: 120  Abduction - R: 30 L: 30  ER- R: 30 L: 25  IR- R:  15 with pain L: 20     Outcome Measures:  Other Measures  Lower Extremity Funtional Score (LEFS): 61/80   Treatments:     THERE EX  PROM of R hip   Banded clamshell 3 x 10 (black)   Supine banded flexion 3 x 10 (black)   Supine adduction squeeze 3 x 10   6\" step up 2 x 10   4\" heel tap 2 x 10     MANUAL  Belted lateral mobs   Long axis distraction     Assessment:  pt tolerated session well. Pt has made good progress during his episode of care via improved outcome scores, strength, and a decrease in pain. Pt was educated to reach out with any questions or concerns in " the future.            Plan: Continue to work on hip strength and ROM      Goals:  Active       PT Problem       PT Goals       Start:  06/25/24       1. Pt will increase LEFS with 65/80 or better in order to demo an increase in R hip function  2. Pt will increase R hip ROM by 10 degrees in order to demo an increase in hip functional mobility   3. Pt will demo 4+/5 or all hip/knee MMT in order to demo an increase in LE strength   4. Pt will be able to ascend and descend 10 steps with reciprocal gait pattern and proper knee control in order to demo and increase in functional mobility   5. Pt will demo compliance and independence with HEP   6.  Pt will be able to walk for 30 mins with step through gait pattern and non antalgic gait pattern                Kobe Amado, PT

## 2024-10-20 DIAGNOSIS — Z00.00 HEALTH CARE MAINTENANCE: ICD-10-CM

## 2024-10-22 RX ORDER — TAMSULOSIN HYDROCHLORIDE 0.4 MG/1
0.4 CAPSULE ORAL DAILY
Qty: 90 CAPSULE | Refills: 1 | Status: SHIPPED | OUTPATIENT
Start: 2024-10-22

## 2024-10-22 NOTE — PROGRESS NOTES
HPI    74 y.o. male being seen with the following problem list:    Problem list:  BPH with 2 prior negative prostate biopsies (most recent 11/27/19)  - Non-invasive papillary urothelial carcinoma, high-grade, diagnosed 11/2018  - S/P completion of BCG induction, and maintenance 10/2019, 06/2020, and 01/22/2021, and bladder biopsy on 06/22/2021.      Cyston with Dr. Baer 5/2024 - completed today due to hematuria and non-invasive papillary urothelial carcinoma, high-grade and demonstrated a large prostate with large medium lobe, hematuria is likely from prostate. There were no tumors, stones or lesions      07/24/24 - seen today to discuss outlet procedures. No bothersome LUTS. Only had the one episode of bleeding in early may, lasted for 5d or so, some clots, but nothing severe. Not inclined to have prostate surgery at this time. PVR 0cc    10/23/24 - Seen back today over telehealth, performed this visit using real-time telehealth tools, including an audio/video connection between Bernabe Fletcher at home and Aron Greene MD at [facility]. Consent for telemedicine visit was obtained. Doing well overall. Urinary symptoms stable, not bothersome. No pain or retention at this point. He would like to continue observation.         Lab Results   Component Value Date    PSA 4.92 (H) 09/22/2023    PSA 4.33 (H) 06/15/2023    PSA 5.1 (H) 02/20/2023    PSA 4.30 (H) 05/26/2020    PSA 5.49 (H) 08/08/2019              Current Medications:  Current Outpatient Medications   Medication Sig Dispense Refill    atorvastatin (Lipitor) 20 mg tablet Take 1 tablet (20 mg) by mouth once daily. 90 tablet 2    losartan (Cozaar) 50 mg tablet TAKE 1 TABLET BY MOUTH EVERY DAY 90 tablet 1    tadalafil (Cialis) 5 mg tablet Take 1 tablet (5 mg) by mouth once daily. 90 tablet 3    tamsulosin (Flomax) 0.4 mg 24 hr capsule TAKE 1 CAPSULE BY MOUTH EVERY DAY 90 capsule 1     No current facility-administered medications for this visit.         Active Problems:  Robson Fletcher is a 74 y.o. male with the following Problems and Medications.  Patient Active Problem List   Diagnosis    Abnormal urinalysis    Benign essential hypertension    Benign hypertensive heart disease    Bladder cancer (Multi)    BPH with elevated PSA    Choroidal nevus of right eye    Combined form of age-related cataract, both eyes    Mixed hyperlipidemia    Contracture, joint, hand    Edema    Glucose intolerance (impaired glucose tolerance)    Gross hematuria    Hearing loss    Hip osteoarthritis    Knee osteoarthritis    Pain of right hip    Left knee pain    Right shoulder pain    Sinus bradycardia    LINDA (obstructive sleep apnea)    Urine frequency    Bilateral renal cysts    Pseudophakia of both eyes    Malignant neoplasm of urinary bladder, unspecified site (Multi)    Stiffness of right hip joint    BMI 33.0-33.9,adult     Current Outpatient Medications   Medication Sig Dispense Refill    atorvastatin (Lipitor) 20 mg tablet Take 1 tablet (20 mg) by mouth once daily. 90 tablet 2    losartan (Cozaar) 50 mg tablet TAKE 1 TABLET BY MOUTH EVERY DAY 90 tablet 1    tadalafil (Cialis) 5 mg tablet Take 1 tablet (5 mg) by mouth once daily. 90 tablet 3    tamsulosin (Flomax) 0.4 mg 24 hr capsule TAKE 1 CAPSULE BY MOUTH EVERY DAY 90 capsule 1     No current facility-administered medications for this visit.       PMH:  Past Medical History:   Diagnosis Date    Essential (primary) hypertension 07/05/2021    Benign essential hypertension       PSH:  Past Surgical History:   Procedure Laterality Date    APPENDECTOMY  07/06/2017    Appendectomy    APPENDECTOMY  10/13/2014    Appendectomy    COLONOSCOPY  10/13/2014    Complete Colonoscopy       FMH:  Family History   Problem Relation Name Age of Onset    Hypertension Father      Lymphoma Father      Heart failure Father         SHx:  Social History     Tobacco Use    Smoking status: Never    Smokeless tobacco: Never   Substance Use Topics     Alcohol use: Not Currently    Drug use: Never       Allergies:  No Known Allergies    Assessment/Plan  Doing well overall, no bothersome urinary symptoms at this point. No pain or hematuria. He would like to continue observation, which I think is reasonable and follow up with Dr. Baer. Will follow up with me as needed. If things degrade or problems arise, he would like to get a HoLEP at that time and he will reach out.    Scribe Attestation  By signing my name below, I, Sangeeta Zuluaga, attest that this documentation  has been prepared under the direction and in the presence of Aron Greene MD.

## 2024-10-23 ENCOUNTER — TELEMEDICINE (OUTPATIENT)
Dept: UROLOGY | Facility: HOSPITAL | Age: 74
End: 2024-10-23
Payer: MEDICARE

## 2024-10-23 DIAGNOSIS — N13.8 BENIGN PROSTATIC HYPERPLASIA WITH URINARY OBSTRUCTION: Primary | ICD-10-CM

## 2024-10-23 DIAGNOSIS — N40.1 BENIGN PROSTATIC HYPERPLASIA WITH URINARY OBSTRUCTION: Primary | ICD-10-CM

## 2024-10-23 PROCEDURE — 1157F ADVNC CARE PLAN IN RCRD: CPT | Performed by: UROLOGY

## 2024-10-23 PROCEDURE — G2211 COMPLEX E/M VISIT ADD ON: HCPCS | Performed by: UROLOGY

## 2024-10-23 PROCEDURE — 99213 OFFICE O/P EST LOW 20 MIN: CPT | Performed by: UROLOGY

## 2024-11-11 ENCOUNTER — APPOINTMENT (OUTPATIENT)
Dept: SLEEP MEDICINE | Facility: CLINIC | Age: 74
End: 2024-11-11
Payer: MEDICARE

## 2024-11-11 VITALS
WEIGHT: 269 LBS | DIASTOLIC BLOOD PRESSURE: 76 MMHG | SYSTOLIC BLOOD PRESSURE: 130 MMHG | HEIGHT: 74 IN | HEART RATE: 85 BPM | BODY MASS INDEX: 34.52 KG/M2 | TEMPERATURE: 97.7 F

## 2024-11-11 DIAGNOSIS — G47.30 SLEEP APNEA, UNSPECIFIED TYPE: ICD-10-CM

## 2024-11-11 DIAGNOSIS — I10 BENIGN ESSENTIAL HYPERTENSION: ICD-10-CM

## 2024-11-11 DIAGNOSIS — G47.33 OSA (OBSTRUCTIVE SLEEP APNEA): Primary | ICD-10-CM

## 2024-11-11 PROCEDURE — G2211 COMPLEX E/M VISIT ADD ON: HCPCS | Performed by: STUDENT IN AN ORGANIZED HEALTH CARE EDUCATION/TRAINING PROGRAM

## 2024-11-11 PROCEDURE — 99214 OFFICE O/P EST MOD 30 MIN: CPT | Performed by: STUDENT IN AN ORGANIZED HEALTH CARE EDUCATION/TRAINING PROGRAM

## 2024-11-11 PROCEDURE — 3008F BODY MASS INDEX DOCD: CPT | Performed by: STUDENT IN AN ORGANIZED HEALTH CARE EDUCATION/TRAINING PROGRAM

## 2024-11-11 PROCEDURE — 3078F DIAST BP <80 MM HG: CPT | Performed by: STUDENT IN AN ORGANIZED HEALTH CARE EDUCATION/TRAINING PROGRAM

## 2024-11-11 PROCEDURE — 1160F RVW MEDS BY RX/DR IN RCRD: CPT | Performed by: STUDENT IN AN ORGANIZED HEALTH CARE EDUCATION/TRAINING PROGRAM

## 2024-11-11 PROCEDURE — 3075F SYST BP GE 130 - 139MM HG: CPT | Performed by: STUDENT IN AN ORGANIZED HEALTH CARE EDUCATION/TRAINING PROGRAM

## 2024-11-11 PROCEDURE — 1159F MED LIST DOCD IN RCRD: CPT | Performed by: STUDENT IN AN ORGANIZED HEALTH CARE EDUCATION/TRAINING PROGRAM

## 2024-11-11 PROCEDURE — 1036F TOBACCO NON-USER: CPT | Performed by: STUDENT IN AN ORGANIZED HEALTH CARE EDUCATION/TRAINING PROGRAM

## 2024-11-11 PROCEDURE — 1157F ADVNC CARE PLAN IN RCRD: CPT | Performed by: STUDENT IN AN ORGANIZED HEALTH CARE EDUCATION/TRAINING PROGRAM

## 2024-11-11 ASSESSMENT — ENCOUNTER SYMPTOMS
NEUROLOGICAL NEGATIVE: 1
PSYCHIATRIC NEGATIVE: 1
CONSTITUTIONAL NEGATIVE: 1
RESPIRATORY NEGATIVE: 1
CARDIOVASCULAR NEGATIVE: 1

## 2024-11-11 ASSESSMENT — SLEEP AND FATIGUE QUESTIONNAIRES
SLEEP_PROBLEM_NOTICEABLE_TO_OTHERS: SOMEWHAT
HOW LIKELY ARE YOU TO NOD OFF OR FALL ASLEEP WHILE WATCHING TV: MODERATE CHANCE OF DOZING
HOW LIKELY ARE YOU TO NOD OFF OR FALL ASLEEP WHEN YOU ARE A PASSENGER IN A CAR FOR AN HOUR WITHOUT A BREAK: SLIGHT CHANCE OF DOZING
HOW LIKELY ARE YOU TO NOD OFF OR FALL ASLEEP IN A CAR, WHILE STOPPED FOR A FEW MINUTES IN TRAFFIC: WOULD NEVER DOZE
HOW LIKELY ARE YOU TO NOD OFF OR FALL ASLEEP WHILE LYING DOWN TO REST IN THE AFTERNOON WHEN CIRCUMSTANCES PERMIT: HIGH CHANCE OF DOZING
DIFFICULTY_STAYING_ASLEEP: MODERATE
HOW LIKELY ARE YOU TO NOD OFF OR FALL ASLEEP WHILE SITTING AND READING: MODERATE CHANCE OF DOZING
SITING INACTIVE IN A PUBLIC PLACE LIKE A CLASS ROOM OR A MOVIE THEATER: WOULD NEVER DOZE
SLEEP_PROBLEM_INTERFERES_DAILY_ACTIVITIES: A LITTLE
WAKING_TOO_EARLY: MILD
DIFFICULTY_FALLING_ASLEEP: MILD
HOW LIKELY ARE YOU TO NOD OFF OR FALL ASLEEP WHILE SITTING QUIETLY AFTER LUNCH WITHOUT ALCOHOL: SLIGHT CHANCE OF DOZING
SATISFACTION_WITH_CURRENT_SLEEP_PATTERN: SATISFIED
HOW LIKELY ARE YOU TO NOD OFF OR FALL ASLEEP WHILE SITTING AND TALKING TO SOMEONE: WOULD NEVER DOZE
ESS-CHAD TOTAL SCORE: 9
WORRIED_DISTRESSED_DUE_TO_SLEEP: A LITTLE

## 2024-11-11 NOTE — PROGRESS NOTES
" Patient: Bernabe Fletcher    [id number]  : 1950 -- AGE 74 y.o.    Provider: Lopez Fry MD     Location [facility]   Service Date: 2024              [facility]  Followup Visit Note        ASSESSMENT/PLAN     Mr. Fletcher is a 74 y.o. male and he returns in followup to the [facility] for the problems listed below on 24     Problem List, Orders, Assessment, Recommendations:  Problem List Items Addressed This Visit             ICD-10-CM    Benign essential hypertension I10     BP Readings from Last 1 Encounters:   24 130/76     - doing well, asymptomatic  - discussed at length the impact of untreated LINDA and BP control  - continue current management and follow-up with PCP          LINDA (obstructive sleep apnea) - Primary G47.33     - got his new machine and he loved it  - recent HSAT showed severe LINDA with AHI in 30's  - doing well with PAP therapy  - continue current setting  - renew PAP supply orders           Relevant Orders    Positive Airway Pressure (PAP) Therapy    Follow Up In Adult Sleep Medicine    BMI 34.0-34.9,adult Z68.34     BMI Readings from Last 1 Encounters:   24 34.54 kg/m²     - encouraged healthy weight loss via diet and exercise  - consider referral to the weight loss program at follow-up visit            Other Visit Diagnoses         Codes    Sleep apnea, unspecified type     G47.30            Disposition  Return to clinic in 12 months       HISTORY OF PRESENT ILLNESS     HISTORY OF PRESENT ILLNESS   Bernabe Fletcher \"Robson\" is a 74 y.o. male with h/o Hypertension, LINDA, and Obesity who presents to a [facility] for followup.     Assessment and plan from last visit: 2024    Mr. Fletcher is a 74 y.o. male and He was referred to the [facility] for evaluation of LINDA     Problem List, Orders, Assessment, Recommendations:  Problem List Items " Addressed This Visit               ICD-10-CM     Benign essential hypertension I10           BP Readings from Last 1 Encounters:   07/08/24 147/80      - BP mildly elevated today but asymptomatic  - discussed at length the impact of untreated LINDA and BP control  - continue current management and follow-up with PCP            LINDA (obstructive sleep apnea) - Primary G47.33       Will get HSAT to assess current disease burden  Current machine (replaced DS1) likely is broken  Will get him a new machine once we have a new HSAT result           BMI 33.0-33.9,adult Z68.33           BMI Readings from Last 1 Encounters:   07/08/24 33.64 kg/m²      - encouraged healthy weight loss via diet and exercise             Current History    On today's visit, the patient reports that his sleep has improved with the new PAP.  He loves it.  He regularly volunteers and is living well.    PAP Info  DURABLE MEDICAL EQUIPMENT COMPANY: [employer]  Issues with therapy: ISSUES WITH THERAPY: None  Benefits with PAP: PERCEIVED BENEFITS OF PAP: refreshing sleep    PAP Adherence  A PAP adherence download was obtained and data was reviewed personally today in clinic.    RLS Followup:   none.    Daytime Symptoms    Patient reports DAYTIME SYMPTOMS: no daytime symptoms  Patient denies daytime symptoms including: Denies: excessive daytime sleepiness    Naps: No  Fatigue: denies feeling fatigue    ESS: 9  AJNIS: 9  FOSQ: 34    REVIEW OF SYSTEMS     REVIEW OF SYSTEMS  Review of Systems   Constitutional: Negative.    HENT: Negative.     Respiratory: Negative.     Cardiovascular: Negative.    Genitourinary: Negative.    Skin: Negative.    Neurological: Negative.    Psychiatric/Behavioral: Negative.           ALLERGIES AND MEDICATIONS     ALLERGIES  No Known Allergies    MEDICATIONS: He has a current medication list which includes the following prescription(s): atorvastatin - Take 1 tablet (20 mg) by mouth once daily, losartan - TAKE 1 TABLET BY  "MOUTH EVERY DAY, and tamsulosin - TAKE 1 CAPSULE BY MOUTH EVERY DAY.    PAST MEDICAL HISTORY : He  has a past medical history of Essential (primary) hypertension (07/05/2021).    PAST SURGICAL HISTORY: He  has a past surgical history that includes Appendectomy (07/06/2017); Appendectomy (10/13/2014); and Colonoscopy (10/13/2014).     FAMILY HISTORY: No changes since previous visit. Otherwise non-contributory as charted.     SOCIAL HISTORY  He  reports that he has never smoked. He has never used smokeless tobacco. He reports that he does not currently use alcohol. He reports that he does not use drugs.       PHYSICAL EXAM     VITAL SIGNS: /76 (BP Location: Right arm, Patient Position: Sitting, BP Cuff Size: Adult)   Pulse 85   Temp 36.5 °C (97.7 °F) (Oral)   Ht 1.88 m (6' 2\")   Wt 122 kg (269 lb)   BMI 34.54 kg/m²      PREVIOUS WEIGHTS:  Wt Readings from Last 3 Encounters:   11/11/24 122 kg (269 lb)   07/08/24 119 kg (262 lb)   06/26/24 117 kg (258 lb 8 oz)         RESULTS/DATA     Bicarbonate (mmol/L)   Date Value   05/13/2024 25   01/31/2024 22   01/22/2024 28               "

## 2024-11-11 NOTE — ASSESSMENT & PLAN NOTE
BP Readings from Last 1 Encounters:   11/11/24 130/76     - doing well, asymptomatic  - discussed at length the impact of untreated LINDA and BP control  - continue current management and follow-up with PCP

## 2024-11-11 NOTE — ASSESSMENT & PLAN NOTE
- got his new machine and he loved it  - recent HSAT showed severe LINDA with AHI in 30's  - doing well with PAP therapy  - continue current setting  - renew PAP supply orders

## 2024-11-11 NOTE — ASSESSMENT & PLAN NOTE
BMI Readings from Last 1 Encounters:   11/11/24 34.54 kg/m²     - encouraged healthy weight loss via diet and exercise  - consider referral to the weight loss program at follow-up visit

## 2025-01-20 ENCOUNTER — HOSPITAL ENCOUNTER (OUTPATIENT)
Dept: RADIOLOGY | Facility: HOSPITAL | Age: 75
Discharge: HOME | End: 2025-01-20
Payer: MEDICARE

## 2025-01-20 DIAGNOSIS — C67.9 MALIGNANT NEOPLASM OF URINARY BLADDER, UNSPECIFIED SITE (MULTI): ICD-10-CM

## 2025-01-20 PROCEDURE — 76770 US EXAM ABDO BACK WALL COMP: CPT | Performed by: RADIOLOGY

## 2025-01-20 PROCEDURE — 76770 US EXAM ABDO BACK WALL COMP: CPT

## 2025-01-23 ENCOUNTER — APPOINTMENT (OUTPATIENT)
Dept: UROLOGY | Facility: HOSPITAL | Age: 75
End: 2025-01-23
Payer: MEDICARE

## 2025-02-08 ENCOUNTER — OFFICE VISIT (OUTPATIENT)
Dept: URGENT CARE | Age: 75
End: 2025-02-08
Payer: MEDICARE

## 2025-02-08 VITALS
BODY MASS INDEX: 33.38 KG/M2 | RESPIRATION RATE: 18 BRPM | DIASTOLIC BLOOD PRESSURE: 78 MMHG | HEART RATE: 76 BPM | OXYGEN SATURATION: 95 % | WEIGHT: 260 LBS | SYSTOLIC BLOOD PRESSURE: 126 MMHG

## 2025-02-08 DIAGNOSIS — S61.012A LACERATION OF LEFT THUMB WITHOUT FOREIGN BODY WITHOUT DAMAGE TO NAIL, INITIAL ENCOUNTER: Primary | ICD-10-CM

## 2025-02-08 ASSESSMENT — ENCOUNTER SYMPTOMS: WOUND: 1

## 2025-02-09 NOTE — PROGRESS NOTES
"Subjective   Patient ID: Bernabe Fletcher \"Lu" is a 74 y.o. male. They present today with a chief complaint of Finger Laceration (Cut his finger open yesterday but it opened back up today ).    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.  No injury to the nail he does not take oral anticoagulant medication          Past Medical History  Allergies as of 02/08/2025    (No Known Allergies)       (Not in a hospital admission)       Past Medical History:   Diagnosis Date    Essential (primary) hypertension 07/05/2021    Benign essential hypertension       Past Surgical History:   Procedure Laterality Date    APPENDECTOMY  07/06/2017    Appendectomy    APPENDECTOMY  10/13/2014    Appendectomy    COLONOSCOPY  10/13/2014    Complete Colonoscopy        reports that he has never smoked. He has never used smokeless tobacco. He reports that he does not currently use alcohol. He reports that he does not use drugs.    Review of Systems  Review of Systems   Skin:  Positive for wound.                                  Objective    Vitals:    02/08/25 1323   BP: 126/78   BP Location: Left arm   Patient Position: Sitting   BP Cuff Size: Adult   Pulse: 76   Resp: 18   SpO2: 95%   Weight: 118 kg (260 lb)     No LMP for male patient.    Physical Exam  Musculoskeletal:         General: No swelling, tenderness or deformity.   Skin:     Findings: Bruising present.      Comments: Superficial avulsion 2 mm laceration, left thumb tip with attached margin, intact neurovascular exam no active bleeding.  Intact nail no bone exposed,    Neurological:      Sensory: No sensory deficit.      Motor: No weakness.         Procedures    Point of Care Test & Imaging Results from this visit  No results found for this visit on 02/08/25.   No results found.    Diagnostic study results (if any) " were reviewed by Saray Phan MD.    Assessment/Plan   Allergies, medications, history, and pertinent labs/EKGs/Imaging reviewed by Saray Phan MD.     Medical Decision Making  Left thumb avulsion laceration, options discussed, treatment with topical antibiotic ointment, Telfa and Coban.  Anticipate improvement by secondary healing over the next 1 week.  If new or worsening symptoms return for evaluation follow-up with PCP as needed.    Orders and Diagnoses  Diagnoses and all orders for this visit:  Laceration of left thumb without foreign body without damage to nail, initial encounter      Medical Admin Record      Patient disposition: Home    Electronically signed by Saray Phan MD  7:44 PM

## 2025-02-09 NOTE — PATIENT INSTRUCTIONS
Left thumb avulsion laceration over 12 hours ago, options discussed, treatment with topical antibiotic ointment, Telfa and Coban.  Anticipate improvement by secondary healing over the next 1 week.  If new or worsening symptoms return for evaluation follow-up with PCP as needed.  Encouraged wound care at home with topical antibiotic ointment, Band-Aid keep it clean and dry.

## 2025-04-24 DIAGNOSIS — I10 BENIGN ESSENTIAL HYPERTENSION: ICD-10-CM

## 2025-04-24 DIAGNOSIS — Z00.00 HEALTH CARE MAINTENANCE: ICD-10-CM

## 2025-04-24 RX ORDER — LOSARTAN POTASSIUM 50 MG/1
50 TABLET ORAL DAILY
Qty: 90 TABLET | Refills: 0 | Status: SHIPPED | OUTPATIENT
Start: 2025-04-24

## 2025-04-24 RX ORDER — ATORVASTATIN CALCIUM 20 MG/1
20 TABLET, FILM COATED ORAL DAILY
Qty: 90 TABLET | Refills: 0 | Status: SHIPPED | OUTPATIENT
Start: 2025-04-24

## 2025-05-02 DIAGNOSIS — Z12.5 SCREENING FOR PROSTATE CANCER: ICD-10-CM

## 2025-05-06 LAB — PSA SERPL-MCNC: 4.91 NG/ML

## 2025-05-07 RX ORDER — CIPROFLOXACIN 500 MG/1
500 TABLET ORAL ONCE
Status: COMPLETED | OUTPATIENT
Start: 2025-05-09 | End: 2025-05-09

## 2025-05-08 NOTE — PROGRESS NOTES
"FUV    Last seen - 5/30/24     HISTORY OF PRESENT ILLNESS:   Bernabe Fletcher is a 75 y.o. male who is being seen today for continued cystoscopic surveillance.    PAST MEDICAL HISTORY:  Past Medical History:   Diagnosis Date    Essential (primary) hypertension 07/05/2021    Benign essential hypertension   - BPH with 2 prior negative prostate biopsies (most recent 11/27/19)  - Non-invasive papillary urothelial carcinoma, high-grade, diagnosed 11/2018  - S/P completion of BCG induction, and maintenance 10/2019, 06/2020, and 01/22/2021, and bladder biopsy on 06/22/2021.     PAST SURGICAL HISTORY:  Past Surgical History:   Procedure Laterality Date    APPENDECTOMY  07/06/2017    Appendectomy    APPENDECTOMY  10/13/2014    Appendectomy    COLONOSCOPY  10/13/2014    Complete Colonoscopy        ALLERGIES:   No Known Allergies     MEDICATIONS:   Current Outpatient Medications   Medication Instructions    atorvastatin (LIPITOR) 20 mg, oral, Daily    losartan (COZAAR) 50 mg, oral, Daily    tamsulosin (FLOMAX) 0.4 mg, oral, Daily        PHYSICAL EXAM:  Blood pressure 150/85, pulse 71.  Constitutional: Patient appears well-developed and well-nourished. No distress.    Pulmonary/Chest: Effort normal. No respiratory distress.   Abdominal: Soft, ND NT  :   Musculoskeletal: Normal range of motion.    Neurological: Alert and oriented to person, place, and time.  Psychiatric: Normal mood and affect. Behavior is normal. Thought content normal.      Labs  No results found for: \"TESTOSTERONE\"  Lab Results   Component Value Date    PSA 4.91 (H) 05/05/2025   PSA panel on 2/20/23 was 5.1 with 27% free. PSA on 06/03/2022 was 4.89 and on 05/26/2020 was 4.3 and 5.49 on 08/08/19.     No components found for: \"CBC\"  Lab Results   Component Value Date    CREATININE 0.96 05/13/2024     No components found for: \"TESTOTMS\"  No results found for: \"TESTF\"    Imaging:   - Renal CT 9/26/23 demonstrating lower pole cyst left kidney with additional " probable parapelvic cyst. No solid enhancing renal mass identified. Atherosclerosis. Mild stenosis of the proximal SMA.  - Surgical pathology on 1/30/23 - Negative    Discussion:   Doing well, no complaints. Reports he experiences hematuria and mild dysuria but this has now resolved. Micro urine on 5/28/24 resulted as 6-10 RBCs but UC on 5/28/24 resulted negative. The cystoscopy was completed today due to non-invasive papillary urothelial carcinoma, high-grade and demonstrated a large prostate with large medium lobe, hematuria is likely from enlarged prostate. There were no tumors, stones or lesions. Cytology sent. 1 abx given to patient in clinic today. Will get a renal US now and another renal US in a year. Will see Dr Greene if urinary issues get bothersome to discuss a HOLEP. Patient instructed to follow up in 1 year for continued surveillance. Renal US from January looked unremarkable.     Cysto schedule: Now on yearly surveillance due to no recurrences in 5 years.     Assessment:      1. Screening for prostate cancer  PSA      2. Malignant neoplasm of urinary bladder, unspecified site (Multi)  Cytology Consultation (Non-Gynecologic)    POCT UA Automated manually resulted    US renal complete    US renal complete      3. Prophylactic antibiotic  ciprofloxacin (Cipro) tablet 500 mg    POCT UA Automated manually resulted        Non-invasive papillary urothelial carcinoma, high-grade  BPH  Bernabe Fletcher is a 75 y.o. male here for FUV     Plan:     Follow up in 1 year for continued cystoscopic surveillance with a renal US prior  All questions and concerns were addressed. Patient verbalizes understanding and has no other questions at this time.    Scribe Attestation  By signing my name below, I, Sangeeta Farrell   attest that this documentation has been prepared under the direction and in the presence of Kuldeep Baer MD.

## 2025-05-09 ENCOUNTER — PROCEDURE VISIT (OUTPATIENT)
Dept: UROLOGY | Facility: HOSPITAL | Age: 75
End: 2025-05-09
Payer: MEDICARE

## 2025-05-09 VITALS — SYSTOLIC BLOOD PRESSURE: 150 MMHG | HEART RATE: 71 BPM | DIASTOLIC BLOOD PRESSURE: 85 MMHG

## 2025-05-09 DIAGNOSIS — C67.9 MALIGNANT NEOPLASM OF URINARY BLADDER, UNSPECIFIED SITE (MULTI): Primary | ICD-10-CM

## 2025-05-09 DIAGNOSIS — Z12.5 SCREENING FOR PROSTATE CANCER: ICD-10-CM

## 2025-05-09 DIAGNOSIS — Z79.2 PROPHYLACTIC ANTIBIOTIC: ICD-10-CM

## 2025-05-09 LAB
POC APPEARANCE, URINE: CLEAR
POC BILIRUBIN, URINE: ABNORMAL
POC BLOOD, URINE: ABNORMAL
POC COLOR, URINE: YELLOW
POC GLUCOSE, URINE: NEGATIVE MG/DL
POC KETONES, URINE: NEGATIVE MG/DL
POC LEUKOCYTES, URINE: NEGATIVE
POC NITRITE,URINE: NEGATIVE
POC PH, URINE: 5.5 PH
POC PROTEIN, URINE: ABNORMAL MG/DL
POC SPECIFIC GRAVITY, URINE: >=1.03
POC UROBILINOGEN, URINE: 0.2 EU/DL

## 2025-05-09 PROCEDURE — 52000 CYSTOURETHROSCOPY: CPT | Performed by: UROLOGY

## 2025-05-09 PROCEDURE — 2500000001 HC RX 250 WO HCPCS SELF ADMINISTERED DRUGS (ALT 637 FOR MEDICARE OP): Performed by: UROLOGY

## 2025-05-09 PROCEDURE — 81003 URINALYSIS AUTO W/O SCOPE: CPT | Mod: QW | Performed by: UROLOGY

## 2025-05-09 RX ADMIN — CIPROFLOXACIN 500 MG: 500 TABLET ORAL at 11:14

## 2025-05-10 ENCOUNTER — LAB (OUTPATIENT)
Dept: LAB | Facility: HOSPITAL | Age: 75
End: 2025-05-10
Payer: MEDICARE

## 2025-05-10 DIAGNOSIS — C67.9 MALIGNANT NEOPLASM OF URINARY BLADDER, UNSPECIFIED SITE (MULTI): ICD-10-CM

## 2025-05-29 ENCOUNTER — APPOINTMENT (OUTPATIENT)
Dept: UROLOGY | Facility: HOSPITAL | Age: 75
End: 2025-05-29
Payer: MEDICARE

## 2025-06-26 ENCOUNTER — APPOINTMENT (OUTPATIENT)
Dept: PRIMARY CARE | Facility: CLINIC | Age: 75
End: 2025-06-26
Payer: MEDICARE

## 2025-06-26 VITALS — DIASTOLIC BLOOD PRESSURE: 76 MMHG | SYSTOLIC BLOOD PRESSURE: 123 MMHG | BODY MASS INDEX: 32.74 KG/M2 | WEIGHT: 255 LBS

## 2025-06-26 DIAGNOSIS — R79.9 ABNORMAL FINDING OF BLOOD CHEMISTRY, UNSPECIFIED: ICD-10-CM

## 2025-06-26 DIAGNOSIS — E78.2 MIXED HYPERLIPIDEMIA: ICD-10-CM

## 2025-06-26 DIAGNOSIS — I10 PRIMARY HYPERTENSION: ICD-10-CM

## 2025-06-26 DIAGNOSIS — E66.09 CLASS 1 OBESITY DUE TO EXCESS CALORIES WITH SERIOUS COMORBIDITY AND BODY MASS INDEX (BMI) OF 33.0 TO 33.9 IN ADULT: ICD-10-CM

## 2025-06-26 DIAGNOSIS — E66.811 CLASS 1 OBESITY DUE TO EXCESS CALORIES WITH SERIOUS COMORBIDITY AND BODY MASS INDEX (BMI) OF 33.0 TO 33.9 IN ADULT: ICD-10-CM

## 2025-06-26 DIAGNOSIS — G47.33 OSA (OBSTRUCTIVE SLEEP APNEA): ICD-10-CM

## 2025-06-26 DIAGNOSIS — Z00.00 HEALTH CARE MAINTENANCE: Primary | ICD-10-CM

## 2025-06-26 PROCEDURE — 93000 ELECTROCARDIOGRAM COMPLETE: CPT | Performed by: INTERNAL MEDICINE

## 2025-06-26 PROCEDURE — 3074F SYST BP LT 130 MM HG: CPT | Performed by: INTERNAL MEDICINE

## 2025-06-26 PROCEDURE — 1160F RVW MEDS BY RX/DR IN RCRD: CPT | Performed by: INTERNAL MEDICINE

## 2025-06-26 PROCEDURE — 1036F TOBACCO NON-USER: CPT | Performed by: INTERNAL MEDICINE

## 2025-06-26 PROCEDURE — 1159F MED LIST DOCD IN RCRD: CPT | Performed by: INTERNAL MEDICINE

## 2025-06-26 PROCEDURE — G0439 PPPS, SUBSEQ VISIT: HCPCS | Performed by: INTERNAL MEDICINE

## 2025-06-26 PROCEDURE — 1170F FXNL STATUS ASSESSED: CPT | Performed by: INTERNAL MEDICINE

## 2025-06-26 PROCEDURE — 3078F DIAST BP <80 MM HG: CPT | Performed by: INTERNAL MEDICINE

## 2025-06-26 PROCEDURE — 99214 OFFICE O/P EST MOD 30 MIN: CPT | Performed by: INTERNAL MEDICINE

## 2025-06-26 NOTE — PROGRESS NOTES
Subjective   Patient ID: Robson Fletcher is a 75 y.o. male who presents for No chief complaint on file..    HPI CPE see updated front sheet no chest pain no shortness of breath no polyuria polydipsia sleeping okay he desires to lose weight has been to the urologist and is considering what to do about his prostate does take the tamsulosin discussed with sleep and sleep apnea bones muscles joints mostly okay    Past medical history BPH hypertension hyperlipidemia osteoarthritis LINDA    Medications noted and unchanged    Allergies no known drug allergies    Social history no tobacco    Family history noted and unchanged    Prevention some exercise some prior blood work reviewed discussed with colonoscopy follow-up with urology regarding prostate and PSA and tamsulosin    Depression screen not depressed      Review of Systems    Objective   There were no vitals taken for this visit.    Physical Exam vital signs noted alert and oriented x 3 NCAT PERRLA EOMI nares without discharge OP benign TM normal bilateral EAC clear bilateral no AC nodes no JVD or bruit no thyromegaly chest clear to auscultation and percussion CV regular rate and rhythm S1-S2 without murmur gallop or rub abdomen soft nontender normal active bowel sounds LS spine normal to straighten curvature nontender spinous process negative straight leg raise extremities no clubbing cyanosis or edema normal distal pulses DTR 2+    Assessment/Plan    impression General medical examination hypertension hyperlipidemia insomnia/LINDA obesity other diagnoses  Plan check EKG advised on heart  check A1c check TSH advised on metabolism good diet regular exercise good water consumption check comprehensive panel advised on glucose potassium and kidney function as well as liver function follow-up with urology as above check CBC advised on blood count sleep hygiene sleep apnea management check lipid panel advised on cholesterol cholesterol medicine advised on blood pressure blood  pressure back hygiene back stretches Tylenol as needed avoidance of NSAIDs recheck 3 months based on above TT 50 cc 26     May be interested in sglp1 with ciara (check)

## 2025-06-27 LAB
ALBUMIN SERPL-MCNC: 4 G/DL (ref 3.6–5.1)
ALP SERPL-CCNC: 75 U/L (ref 35–144)
ALT SERPL-CCNC: 14 U/L (ref 9–46)
ANION GAP SERPL CALCULATED.4IONS-SCNC: 7 MMOL/L (CALC) (ref 7–17)
AST SERPL-CCNC: 18 U/L (ref 10–35)
BILIRUB SERPL-MCNC: 1.2 MG/DL (ref 0.2–1.2)
BUN SERPL-MCNC: 20 MG/DL (ref 7–25)
CALCIUM SERPL-MCNC: 8.7 MG/DL (ref 8.6–10.3)
CHLORIDE SERPL-SCNC: 108 MMOL/L (ref 98–110)
CHOLEST SERPL-MCNC: 157 MG/DL
CHOLEST/HDLC SERPL: 3.1 (CALC)
CO2 SERPL-SCNC: 25 MMOL/L (ref 20–32)
CREAT SERPL-MCNC: 0.94 MG/DL (ref 0.7–1.28)
EGFRCR SERPLBLD CKD-EPI 2021: 85 ML/MIN/1.73M2
ERYTHROCYTE [DISTWIDTH] IN BLOOD BY AUTOMATED COUNT: 13.3 % (ref 11–15)
EST. AVERAGE GLUCOSE BLD GHB EST-MCNC: 105 MG/DL
EST. AVERAGE GLUCOSE BLD GHB EST-SCNC: 5.8 MMOL/L
GLUCOSE SERPL-MCNC: 93 MG/DL (ref 65–99)
HBA1C MFR BLD: 5.3 %
HCT VFR BLD AUTO: 47.2 % (ref 38.5–50)
HDLC SERPL-MCNC: 51 MG/DL
HGB BLD-MCNC: 14.9 G/DL (ref 13.2–17.1)
LDLC SERPL CALC-MCNC: 92 MG/DL (CALC)
MCH RBC QN AUTO: 30 PG (ref 27–33)
MCHC RBC AUTO-ENTMCNC: 31.6 G/DL (ref 32–36)
MCV RBC AUTO: 95.2 FL (ref 80–100)
NONHDLC SERPL-MCNC: 106 MG/DL (CALC)
PLATELET # BLD AUTO: 225 THOUSAND/UL (ref 140–400)
PMV BLD REES-ECKER: 8.8 FL (ref 7.5–12.5)
POTASSIUM SERPL-SCNC: 4.4 MMOL/L (ref 3.5–5.3)
PROT SERPL-MCNC: 6.5 G/DL (ref 6.1–8.1)
RBC # BLD AUTO: 4.96 MILLION/UL (ref 4.2–5.8)
SODIUM SERPL-SCNC: 140 MMOL/L (ref 135–146)
TRIGL SERPL-MCNC: 59 MG/DL
TSH SERPL-ACNC: 2.1 MIU/L (ref 0.4–4.5)
WBC # BLD AUTO: 4.2 THOUSAND/UL (ref 3.8–10.8)

## 2025-06-29 ASSESSMENT — ACTIVITIES OF DAILY LIVING (ADL)
BATHING: INDEPENDENT
GROCERY_SHOPPING: INDEPENDENT
TAKING_MEDICATION: INDEPENDENT
DRESSING: INDEPENDENT
DOING_HOUSEWORK: INDEPENDENT
MANAGING_FINANCES: INDEPENDENT

## 2025-06-29 ASSESSMENT — ENCOUNTER SYMPTOMS
OCCASIONAL FEELINGS OF UNSTEADINESS: 0
DEPRESSION: 0
LOSS OF SENSATION IN FEET: 0

## 2025-06-29 ASSESSMENT — PATIENT HEALTH QUESTIONNAIRE - PHQ9
SUM OF ALL RESPONSES TO PHQ9 QUESTIONS 1 AND 2: 0
1. LITTLE INTEREST OR PLEASURE IN DOING THINGS: NOT AT ALL
2. FEELING DOWN, DEPRESSED OR HOPELESS: NOT AT ALL

## 2025-07-20 DIAGNOSIS — Z00.00 HEALTH CARE MAINTENANCE: ICD-10-CM

## 2025-07-20 DIAGNOSIS — I10 BENIGN ESSENTIAL HYPERTENSION: ICD-10-CM

## 2025-07-25 RX ORDER — TAMSULOSIN HYDROCHLORIDE 0.4 MG/1
0.4 CAPSULE ORAL DAILY
Qty: 90 CAPSULE | Refills: 0 | Status: SHIPPED | OUTPATIENT
Start: 2025-07-25

## 2025-07-25 RX ORDER — ATORVASTATIN CALCIUM 20 MG/1
20 TABLET, FILM COATED ORAL DAILY
Qty: 90 TABLET | Refills: 0 | Status: SHIPPED | OUTPATIENT
Start: 2025-07-25

## 2025-07-25 RX ORDER — LOSARTAN POTASSIUM 50 MG/1
50 TABLET ORAL DAILY
Qty: 90 TABLET | Refills: 0 | Status: SHIPPED | OUTPATIENT
Start: 2025-07-25

## 2025-08-14 ENCOUNTER — APPOINTMENT (OUTPATIENT)
Dept: OPHTHALMOLOGY | Facility: CLINIC | Age: 75
End: 2025-08-14
Payer: MEDICARE

## 2025-08-14 DIAGNOSIS — H26.492 PCO (POSTERIOR CAPSULAR OPACIFICATION), LEFT: ICD-10-CM

## 2025-08-14 DIAGNOSIS — H43.811 PVD (POSTERIOR VITREOUS DETACHMENT), RIGHT EYE: Primary | ICD-10-CM

## 2025-08-14 DIAGNOSIS — Z96.1 PSEUDOPHAKIA OF BOTH EYES: ICD-10-CM

## 2025-08-14 PROCEDURE — 92014 COMPRE OPH EXAM EST PT 1/>: CPT | Performed by: OPHTHALMOLOGY

## 2025-08-14 ASSESSMENT — VISUAL ACUITY
CORRECTION_TYPE: GLASSES
OS_CC: 20/20
METHOD: SNELLEN - LINEAR
OD_CC: 20/20
OD_CC+: -2

## 2025-08-14 ASSESSMENT — EXTERNAL EXAM - LEFT EYE: OS_EXAM: NORMAL

## 2025-08-14 ASSESSMENT — CUP TO DISC RATIO
OS_RATIO: .3
OD_RATIO: .3

## 2025-08-14 ASSESSMENT — TONOMETRY
OS_IOP_MMHG: 11
IOP_METHOD: GOLDMANN APPLANATION
OD_IOP_MMHG: 10

## 2025-08-14 ASSESSMENT — ENCOUNTER SYMPTOMS
ENDOCRINE NEGATIVE: 0
CARDIOVASCULAR NEGATIVE: 0
ALLERGIC/IMMUNOLOGIC NEGATIVE: 0
GASTROINTESTINAL NEGATIVE: 0
RESPIRATORY NEGATIVE: 0
CONSTITUTIONAL NEGATIVE: 0
NEUROLOGICAL NEGATIVE: 0
MUSCULOSKELETAL NEGATIVE: 0
PSYCHIATRIC NEGATIVE: 0
HEMATOLOGIC/LYMPHATIC NEGATIVE: 0
EYES NEGATIVE: 0

## 2025-08-14 ASSESSMENT — REFRACTION_MANIFEST
OD_SPHERE: +1.75
OS_ADD: +2.50
OD_CYLINDER: -1.50
OS_AXIS: 105
OS_SPHERE: +1.50
OS_CYLINDER: -0.75
OD_ADD: +2.50
OD_AXIS: 080

## 2025-08-14 ASSESSMENT — CONF VISUAL FIELD
OD_SUPERIOR_NASAL_RESTRICTION: 0
OS_INFERIOR_TEMPORAL_RESTRICTION: 0
OD_INFERIOR_NASAL_RESTRICTION: 0
OD_NORMAL: 1
OS_SUPERIOR_NASAL_RESTRICTION: 0
OS_INFERIOR_NASAL_RESTRICTION: 0
OS_NORMAL: 1
OS_SUPERIOR_TEMPORAL_RESTRICTION: 0
OD_SUPERIOR_TEMPORAL_RESTRICTION: 0
OD_INFERIOR_TEMPORAL_RESTRICTION: 0
METHOD: COUNTING FINGERS

## 2025-08-14 ASSESSMENT — SLIT LAMP EXAM - LIDS
COMMENTS: GOOD POSITION
COMMENTS: GOOD POSITION

## 2025-08-14 ASSESSMENT — EXTERNAL EXAM - RIGHT EYE: OD_EXAM: NORMAL

## 2025-08-14 ASSESSMENT — REFRACTION_WEARINGRX
OS_AXIS: 105
OS_SPHERE: +1.75
OS_CYLINDER: -0.75
OD_ADD: +2.50
OD_CYLINDER: -1.25
OD_AXIS: 080
OD_SPHERE: +1.75
OS_ADD: +2.50

## 2025-11-17 ENCOUNTER — APPOINTMENT (OUTPATIENT)
Dept: SLEEP MEDICINE | Facility: CLINIC | Age: 75
End: 2025-11-17
Payer: MEDICARE

## 2026-06-29 ENCOUNTER — APPOINTMENT (OUTPATIENT)
Dept: PRIMARY CARE | Facility: CLINIC | Age: 76
End: 2026-06-29
Payer: MEDICARE

## 2026-08-18 ENCOUNTER — APPOINTMENT (OUTPATIENT)
Dept: OPHTHALMOLOGY | Facility: CLINIC | Age: 76
End: 2026-08-18
Payer: MEDICARE

## (undated) DEVICE — MANIFOLD, 4 PORT NEPTUNE STANDARD

## (undated) DEVICE — TUBING, SUCTION, CONNECTING, STERILE 0.25 X 120 IN., LF

## (undated) DEVICE — STATLOCK, FOLEY SWIVEL, SILICONE TRICOT

## (undated) DEVICE — COVER, CART, 45 X 27 X 48 IN, CLEAR

## (undated) DEVICE — Device

## (undated) DEVICE — COLLECTION BAG, FLUID, F/STERIS LOOP, STERILE

## (undated) DEVICE — BAG, DRAINAGE, URINARY, W/ANTI-REFLUX CHAMBER, INFECTION CON

## (undated) DEVICE — SYRINGE, 60 CC, LUER LOCK, MONOJECT, W/O CAP, LF

## (undated) DEVICE — TOWEL, SURGICAL, NEURO, O/R, 16 X 26, BLUE, STERILE

## (undated) DEVICE — ELECTRODE, HF-RESECTION, PLASMALOOP, 24FR, 12/16 DEG, STANDARD